# Patient Record
Sex: FEMALE | Race: WHITE | NOT HISPANIC OR LATINO | Employment: FULL TIME | URBAN - METROPOLITAN AREA
[De-identification: names, ages, dates, MRNs, and addresses within clinical notes are randomized per-mention and may not be internally consistent; named-entity substitution may affect disease eponyms.]

---

## 2019-01-03 LAB
EXTERNAL HEPATITIS B SURFACE ANTIGEN: NEGATIVE
EXTERNAL HEPATITIS C AB: NEGATIVE
EXTERNAL RUBELLA QUALITATIVE: NORMAL
HIV1 P24 AG SERPL QL IA: NORMAL

## 2019-05-31 LAB — EXTERNAL GTT 1 HOUR: 109

## 2019-07-24 ENCOUNTER — LAB (OUTPATIENT)
Dept: LAB | Facility: HOSPITAL | Age: 34
End: 2019-07-24

## 2019-07-24 ENCOUNTER — TRANSCRIBE ORDERS (OUTPATIENT)
Dept: LAB | Facility: HOSPITAL | Age: 34
End: 2019-07-24

## 2019-07-24 DIAGNOSIS — Z34.03 ENCOUNTER FOR SUPERVISION OF NORMAL FIRST PREGNANCY IN THIRD TRIMESTER: Primary | ICD-10-CM

## 2019-07-24 DIAGNOSIS — Z34.03 ENCOUNTER FOR SUPERVISION OF NORMAL FIRST PREGNANCY IN THIRD TRIMESTER: ICD-10-CM

## 2019-07-24 PROCEDURE — 87081 CULTURE SCREEN ONLY: CPT

## 2019-07-27 LAB
BACTERIA SPEC AEROBE CULT: ABNORMAL
STREP GROUPING: ABNORMAL

## 2019-08-06 ENCOUNTER — ANESTHESIA (OUTPATIENT)
Dept: LABOR AND DELIVERY | Facility: HOSPITAL | Age: 34
End: 2019-08-06

## 2019-08-06 ENCOUNTER — ANESTHESIA EVENT (OUTPATIENT)
Dept: LABOR AND DELIVERY | Facility: HOSPITAL | Age: 34
End: 2019-08-06

## 2019-08-06 ENCOUNTER — HOSPITAL ENCOUNTER (INPATIENT)
Facility: HOSPITAL | Age: 34
LOS: 2 days | Discharge: HOME | End: 2019-08-08
Attending: OBSTETRICS & GYNECOLOGY | Admitting: OBSTETRICS & GYNECOLOGY

## 2019-08-06 PROBLEM — Z37.9 NORMAL LABOR: Status: ACTIVE | Noted: 2019-08-06

## 2019-08-06 LAB
ABO GROUP BLD: NORMAL
ALP SERPL-CCNC: 125 U/L (ref 39–117)
ALT SERPL W P-5'-P-CCNC: 9 U/L (ref 1–33)
AMPHET+METHAMPHET UR QL: NEGATIVE
AMPHETAMINES UR QL: NEGATIVE
AST SERPL-CCNC: 13 U/L (ref 1–32)
BARBITURATES UR QL SCN: NEGATIVE
BENZODIAZ UR QL SCN: NEGATIVE
BILIRUB SERPL-MCNC: 0.2 MG/DL (ref 0.2–1.2)
BLD GP AB SCN SERPL QL: NEGATIVE
BUPRENORPHINE SERPL-MCNC: NEGATIVE NG/ML
CANNABINOIDS SERPL QL: NEGATIVE
COCAINE UR QL: NEGATIVE
CREAT BLD-MCNC: 0.44 MG/DL (ref 0.57–1)
DEPRECATED RDW RBC AUTO: 39.8 FL (ref 37–54)
ERYTHROCYTE [DISTWIDTH] IN BLOOD BY AUTOMATED COUNT: 12.3 % (ref 12.3–15.4)
HCT VFR BLD AUTO: 40.2 % (ref 34–46.6)
HGB BLD-MCNC: 13.6 G/DL (ref 12–15.9)
LDH SERPL-CCNC: 147 U/L (ref 135–214)
MCH RBC QN AUTO: 30.5 PG (ref 26.6–33)
MCHC RBC AUTO-ENTMCNC: 33.8 G/DL (ref 31.5–35.7)
MCV RBC AUTO: 90.1 FL (ref 79–97)
METHADONE UR QL SCN: NEGATIVE
OPIATES UR QL: NEGATIVE
OXYCODONE UR QL SCN: NEGATIVE
PCP UR QL SCN: NEGATIVE
PLATELET # BLD AUTO: 308 10*3/MM3 (ref 140–450)
PMV BLD AUTO: 11.4 FL (ref 6–12)
PROPOXYPH UR QL: NEGATIVE
RBC # BLD AUTO: 4.46 10*6/MM3 (ref 3.77–5.28)
RH BLD: POSITIVE
T&S EXPIRATION DATE: NORMAL
TRICYCLICS UR QL SCN: NEGATIVE
URATE SERPL-MCNC: 3.4 MG/DL (ref 2.4–5.7)
WBC NRBC COR # BLD: 10.52 10*3/MM3 (ref 3.4–10.8)

## 2019-08-06 PROCEDURE — 84450 TRANSFERASE (AST) (SGOT): CPT | Performed by: OBSTETRICS & GYNECOLOGY

## 2019-08-06 PROCEDURE — 86850 RBC ANTIBODY SCREEN: CPT | Performed by: OBSTETRICS & GYNECOLOGY

## 2019-08-06 PROCEDURE — 84550 ASSAY OF BLOOD/URIC ACID: CPT | Performed by: OBSTETRICS & GYNECOLOGY

## 2019-08-06 PROCEDURE — 82565 ASSAY OF CREATININE: CPT | Performed by: OBSTETRICS & GYNECOLOGY

## 2019-08-06 PROCEDURE — 84460 ALANINE AMINO (ALT) (SGPT): CPT | Performed by: OBSTETRICS & GYNECOLOGY

## 2019-08-06 PROCEDURE — 80306 DRUG TEST PRSMV INSTRMNT: CPT | Performed by: OBSTETRICS & GYNECOLOGY

## 2019-08-06 PROCEDURE — C1755 CATHETER, INTRASPINAL: HCPCS | Performed by: ANESTHESIOLOGY

## 2019-08-06 PROCEDURE — 86900 BLOOD TYPING SEROLOGIC ABO: CPT | Performed by: OBSTETRICS & GYNECOLOGY

## 2019-08-06 PROCEDURE — 86901 BLOOD TYPING SEROLOGIC RH(D): CPT | Performed by: OBSTETRICS & GYNECOLOGY

## 2019-08-06 PROCEDURE — 59025 FETAL NON-STRESS TEST: CPT

## 2019-08-06 PROCEDURE — 83615 LACTATE (LD) (LDH) ENZYME: CPT | Performed by: OBSTETRICS & GYNECOLOGY

## 2019-08-06 PROCEDURE — 86901 BLOOD TYPING SEROLOGIC RH(D): CPT

## 2019-08-06 PROCEDURE — 86900 BLOOD TYPING SEROLOGIC ABO: CPT

## 2019-08-06 PROCEDURE — 51703 INSERT BLADDER CATH COMPLEX: CPT

## 2019-08-06 PROCEDURE — 25010000002 ROPIVACAINE PER 1 MG: Performed by: ANESTHESIOLOGY

## 2019-08-06 PROCEDURE — 25010000002 PENICILLIN G POTASSIUM PER 600000 UNITS: Performed by: OBSTETRICS & GYNECOLOGY

## 2019-08-06 PROCEDURE — C1755 CATHETER, INTRASPINAL: HCPCS

## 2019-08-06 PROCEDURE — 25010000002 ONDANSETRON PER 1 MG: Performed by: OBSTETRICS & GYNECOLOGY

## 2019-08-06 PROCEDURE — 84075 ASSAY ALKALINE PHOSPHATASE: CPT | Performed by: OBSTETRICS & GYNECOLOGY

## 2019-08-06 PROCEDURE — 82247 BILIRUBIN TOTAL: CPT | Performed by: OBSTETRICS & GYNECOLOGY

## 2019-08-06 PROCEDURE — 85027 COMPLETE CBC AUTOMATED: CPT | Performed by: OBSTETRICS & GYNECOLOGY

## 2019-08-06 PROCEDURE — 25010000002 FENTANYL CITRATE (PF) 100 MCG/2ML SOLUTION: Performed by: ANESTHESIOLOGY

## 2019-08-06 RX ORDER — EPHEDRINE SULFATE/0.9% NACL/PF 25 MG/5 ML
5 SYRINGE (ML) INTRAVENOUS
Status: DISCONTINUED | OUTPATIENT
Start: 2019-08-06 | End: 2019-08-06 | Stop reason: HOSPADM

## 2019-08-06 RX ORDER — LIDOCAINE HYDROCHLORIDE 10 MG/ML
5 INJECTION, SOLUTION EPIDURAL; INFILTRATION; INTRACAUDAL; PERINEURAL AS NEEDED
Status: DISCONTINUED | OUTPATIENT
Start: 2019-08-06 | End: 2019-08-06 | Stop reason: HOSPADM

## 2019-08-06 RX ORDER — OXYCODONE HYDROCHLORIDE AND ACETAMINOPHEN 5; 325 MG/1; MG/1
1 TABLET ORAL EVERY 4 HOURS PRN
Status: DISCONTINUED | OUTPATIENT
Start: 2019-08-06 | End: 2019-08-08 | Stop reason: HOSPADM

## 2019-08-06 RX ORDER — PROMETHAZINE HYDROCHLORIDE 12.5 MG/1
12.5 SUPPOSITORY RECTAL EVERY 6 HOURS PRN
Status: DISCONTINUED | OUTPATIENT
Start: 2019-08-06 | End: 2019-08-08 | Stop reason: HOSPADM

## 2019-08-06 RX ORDER — SODIUM CHLORIDE 0.9 % (FLUSH) 0.9 %
3-10 SYRINGE (ML) INJECTION AS NEEDED
Status: DISCONTINUED | OUTPATIENT
Start: 2019-08-06 | End: 2019-08-06 | Stop reason: HOSPADM

## 2019-08-06 RX ORDER — PRENATAL VIT/IRON FUM/FOLIC AC 27MG-0.8MG
1 TABLET ORAL DAILY
Status: DISCONTINUED | OUTPATIENT
Start: 2019-08-07 | End: 2019-08-08 | Stop reason: HOSPADM

## 2019-08-06 RX ORDER — MORPHINE SULFATE 2 MG/ML
2 INJECTION, SOLUTION INTRAMUSCULAR; INTRAVENOUS
Status: DISCONTINUED | OUTPATIENT
Start: 2019-08-06 | End: 2019-08-06 | Stop reason: HOSPADM

## 2019-08-06 RX ORDER — ACETAMINOPHEN 325 MG/1
650 TABLET ORAL EVERY 4 HOURS PRN
Status: DISCONTINUED | OUTPATIENT
Start: 2019-08-06 | End: 2019-08-06 | Stop reason: SDUPTHER

## 2019-08-06 RX ORDER — OXYTOCIN-SODIUM CHLORIDE 0.9% IV SOLN 30 UNIT/500ML 30-0.9/5 UT/ML-%
85 SOLUTION INTRAVENOUS ONCE
Status: COMPLETED | OUTPATIENT
Start: 2019-08-06 | End: 2019-08-06

## 2019-08-06 RX ORDER — PROMETHAZINE HYDROCHLORIDE 25 MG/ML
12.5 INJECTION, SOLUTION INTRAMUSCULAR; INTRAVENOUS EVERY 6 HOURS PRN
Status: DISCONTINUED | OUTPATIENT
Start: 2019-08-06 | End: 2019-08-06 | Stop reason: SDUPTHER

## 2019-08-06 RX ORDER — HYDROCODONE BITARTRATE AND ACETAMINOPHEN 5; 325 MG/1; MG/1
1 TABLET ORAL EVERY 4 HOURS PRN
Status: DISCONTINUED | OUTPATIENT
Start: 2019-08-06 | End: 2019-08-08 | Stop reason: HOSPADM

## 2019-08-06 RX ORDER — OXYCODONE HYDROCHLORIDE AND ACETAMINOPHEN 5; 325 MG/1; MG/1
2 TABLET ORAL EVERY 4 HOURS PRN
Status: DISCONTINUED | OUTPATIENT
Start: 2019-08-06 | End: 2019-08-06 | Stop reason: HOSPADM

## 2019-08-06 RX ORDER — OXYTOCIN-SODIUM CHLORIDE 0.9% IV SOLN 30 UNIT/500ML 30-0.9/5 UT/ML-%
650 SOLUTION INTRAVENOUS ONCE
Status: COMPLETED | OUTPATIENT
Start: 2019-08-06 | End: 2019-08-06

## 2019-08-06 RX ORDER — PROMETHAZINE HYDROCHLORIDE 12.5 MG/1
12.5 SUPPOSITORY RECTAL EVERY 6 HOURS PRN
Status: DISCONTINUED | OUTPATIENT
Start: 2019-08-06 | End: 2019-08-06 | Stop reason: SDUPTHER

## 2019-08-06 RX ORDER — DOCUSATE SODIUM 100 MG/1
100 CAPSULE, LIQUID FILLED ORAL 2 TIMES DAILY PRN
Status: DISCONTINUED | OUTPATIENT
Start: 2019-08-06 | End: 2019-08-08 | Stop reason: HOSPADM

## 2019-08-06 RX ORDER — PROMETHAZINE HYDROCHLORIDE 25 MG/ML
12.5 INJECTION, SOLUTION INTRAMUSCULAR; INTRAVENOUS EVERY 6 HOURS PRN
Status: DISCONTINUED | OUTPATIENT
Start: 2019-08-06 | End: 2019-08-06 | Stop reason: HOSPADM

## 2019-08-06 RX ORDER — PROMETHAZINE HYDROCHLORIDE 12.5 MG/1
12.5 TABLET ORAL EVERY 6 HOURS PRN
Status: DISCONTINUED | OUTPATIENT
Start: 2019-08-06 | End: 2019-08-06 | Stop reason: HOSPADM

## 2019-08-06 RX ORDER — BISACODYL 10 MG
10 SUPPOSITORY, RECTAL RECTAL DAILY PRN
Status: DISCONTINUED | OUTPATIENT
Start: 2019-08-07 | End: 2019-08-08 | Stop reason: HOSPADM

## 2019-08-06 RX ORDER — IBUPROFEN 600 MG/1
600 TABLET ORAL EVERY 6 HOURS PRN
Status: DISCONTINUED | OUTPATIENT
Start: 2019-08-06 | End: 2019-08-06 | Stop reason: HOSPADM

## 2019-08-06 RX ORDER — ONDANSETRON 2 MG/ML
4 INJECTION INTRAMUSCULAR; INTRAVENOUS EVERY 6 HOURS PRN
Status: DISCONTINUED | OUTPATIENT
Start: 2019-08-06 | End: 2019-08-06 | Stop reason: HOSPADM

## 2019-08-06 RX ORDER — ACETAMINOPHEN 325 MG/1
650 TABLET ORAL EVERY 4 HOURS PRN
Status: DISCONTINUED | OUTPATIENT
Start: 2019-08-06 | End: 2019-08-06 | Stop reason: HOSPADM

## 2019-08-06 RX ORDER — MAGNESIUM CARB/ALUMINUM HYDROX 105-160MG
30 TABLET,CHEWABLE ORAL ONCE
Status: DISCONTINUED | OUTPATIENT
Start: 2019-08-06 | End: 2019-08-06 | Stop reason: HOSPADM

## 2019-08-06 RX ORDER — ACETAMINOPHEN 325 MG/1
650 TABLET ORAL EVERY 4 HOURS PRN
Status: DISCONTINUED | OUTPATIENT
Start: 2019-08-06 | End: 2019-08-08 | Stop reason: HOSPADM

## 2019-08-06 RX ORDER — ROPIVACAINE HYDROCHLORIDE 2 MG/ML
15 INJECTION, SOLUTION EPIDURAL; INFILTRATION; PERINEURAL CONTINUOUS
Status: DISCONTINUED | OUTPATIENT
Start: 2019-08-06 | End: 2019-08-08

## 2019-08-06 RX ORDER — SODIUM CHLORIDE 0.9 % (FLUSH) 0.9 %
3 SYRINGE (ML) INJECTION EVERY 12 HOURS SCHEDULED
Status: DISCONTINUED | OUTPATIENT
Start: 2019-08-06 | End: 2019-08-06 | Stop reason: HOSPADM

## 2019-08-06 RX ORDER — ROPIVACAINE HYDROCHLORIDE 5 MG/ML
INJECTION, SOLUTION EPIDURAL; INFILTRATION; PERINEURAL AS NEEDED
Status: DISCONTINUED | OUTPATIENT
Start: 2019-08-06 | End: 2019-08-06 | Stop reason: SURG

## 2019-08-06 RX ORDER — LIDOCAINE HYDROCHLORIDE AND EPINEPHRINE 15; 5 MG/ML; UG/ML
INJECTION, SOLUTION EPIDURAL AS NEEDED
Status: DISCONTINUED | OUTPATIENT
Start: 2019-08-06 | End: 2019-08-06 | Stop reason: SURG

## 2019-08-06 RX ORDER — MORPHINE SULFATE 2 MG/ML
2 INJECTION, SOLUTION INTRAMUSCULAR; INTRAVENOUS
Status: DISCONTINUED | OUTPATIENT
Start: 2019-08-06 | End: 2019-08-06 | Stop reason: SDUPTHER

## 2019-08-06 RX ORDER — PENICILLIN G 3000000 [IU]/50ML
3 INJECTION, SOLUTION INTRAVENOUS EVERY 4 HOURS
Status: DISCONTINUED | OUTPATIENT
Start: 2019-08-06 | End: 2019-08-06 | Stop reason: HOSPADM

## 2019-08-06 RX ORDER — PROMETHAZINE HYDROCHLORIDE 25 MG/1
25 TABLET ORAL EVERY 6 HOURS PRN
Status: DISCONTINUED | OUTPATIENT
Start: 2019-08-06 | End: 2019-08-08 | Stop reason: HOSPADM

## 2019-08-06 RX ORDER — OXYTOCIN-SODIUM CHLORIDE 0.9% IV SOLN 30 UNIT/500ML 30-0.9/5 UT/ML-%
2-24 SOLUTION INTRAVENOUS
Status: DISCONTINUED | OUTPATIENT
Start: 2019-08-06 | End: 2019-08-08

## 2019-08-06 RX ORDER — SODIUM CHLORIDE 0.9 % (FLUSH) 0.9 %
1-10 SYRINGE (ML) INJECTION AS NEEDED
Status: DISCONTINUED | OUTPATIENT
Start: 2019-08-06 | End: 2019-08-08 | Stop reason: HOSPADM

## 2019-08-06 RX ORDER — PROMETHAZINE HYDROCHLORIDE 12.5 MG/1
12.5 SUPPOSITORY RECTAL EVERY 6 HOURS PRN
Status: DISCONTINUED | OUTPATIENT
Start: 2019-08-06 | End: 2019-08-06 | Stop reason: HOSPADM

## 2019-08-06 RX ORDER — PROMETHAZINE HYDROCHLORIDE 12.5 MG/1
12.5 TABLET ORAL EVERY 6 HOURS PRN
Status: DISCONTINUED | OUTPATIENT
Start: 2019-08-06 | End: 2019-08-06 | Stop reason: SDUPTHER

## 2019-08-06 RX ORDER — ONDANSETRON 4 MG/1
4 TABLET, FILM COATED ORAL EVERY 6 HOURS PRN
Status: DISCONTINUED | OUTPATIENT
Start: 2019-08-06 | End: 2019-08-06 | Stop reason: HOSPADM

## 2019-08-06 RX ORDER — ZOLPIDEM TARTRATE 5 MG/1
5 TABLET ORAL NIGHTLY PRN
Status: DISCONTINUED | OUTPATIENT
Start: 2019-08-06 | End: 2019-08-08 | Stop reason: HOSPADM

## 2019-08-06 RX ORDER — PRENATAL WITH FERROUS FUM AND FOLIC ACID 3080; 920; 120; 400; 22; 1.84; 3; 20; 10; 1; 12; 200; 27; 25; 2 [IU]/1; [IU]/1; MG/1; [IU]/1; MG/1; MG/1; MG/1; MG/1; MG/1; MG/1; UG/1; MG/1; MG/1; MG/1; MG/1
TABLET ORAL DAILY
COMMUNITY
End: 2022-01-18

## 2019-08-06 RX ORDER — IBUPROFEN 600 MG/1
600 TABLET ORAL EVERY 6 HOURS PRN
Status: DISCONTINUED | OUTPATIENT
Start: 2019-08-06 | End: 2019-08-08 | Stop reason: HOSPADM

## 2019-08-06 RX ORDER — PROMETHAZINE HYDROCHLORIDE 25 MG/ML
12.5 INJECTION, SOLUTION INTRAMUSCULAR; INTRAVENOUS EVERY 6 HOURS PRN
Status: DISCONTINUED | OUTPATIENT
Start: 2019-08-06 | End: 2019-08-08 | Stop reason: HOSPADM

## 2019-08-06 RX ORDER — FENTANYL CITRATE 50 UG/ML
INJECTION, SOLUTION INTRAMUSCULAR; INTRAVENOUS AS NEEDED
Status: DISCONTINUED | OUTPATIENT
Start: 2019-08-06 | End: 2019-08-06 | Stop reason: SURG

## 2019-08-06 RX ORDER — SODIUM CHLORIDE, SODIUM LACTATE, POTASSIUM CHLORIDE, CALCIUM CHLORIDE 600; 310; 30; 20 MG/100ML; MG/100ML; MG/100ML; MG/100ML
125 INJECTION, SOLUTION INTRAVENOUS CONTINUOUS
Status: DISCONTINUED | OUTPATIENT
Start: 2019-08-06 | End: 2019-08-08

## 2019-08-06 RX ADMIN — OXYTOCIN 650 ML/HR: 10 INJECTION INTRAVENOUS at 19:23

## 2019-08-06 RX ADMIN — OXYTOCIN 85 ML/HR: 10 INJECTION INTRAVENOUS at 19:53

## 2019-08-06 RX ADMIN — LIDOCAINE HYDROCHLORIDE AND EPINEPHRINE 3 ML: 15; 5 INJECTION, SOLUTION EPIDURAL at 11:29

## 2019-08-06 RX ADMIN — ACETAMINOPHEN 650 MG: 325 TABLET ORAL at 21:39

## 2019-08-06 RX ADMIN — WITCH HAZEL 1 PAD: 500 SOLUTION RECTAL; TOPICAL at 21:39

## 2019-08-06 RX ADMIN — ONDANSETRON 4 MG: 2 INJECTION INTRAMUSCULAR; INTRAVENOUS at 12:09

## 2019-08-06 RX ADMIN — ONDANSETRON 4 MG: 2 INJECTION INTRAMUSCULAR; INTRAVENOUS at 18:28

## 2019-08-06 RX ADMIN — ROPIVACAINE HYDROCHLORIDE 15 ML/HR: 2 INJECTION, SOLUTION EPIDURAL; INFILTRATION at 11:29

## 2019-08-06 RX ADMIN — OXYTOCIN 2 MILLI-UNITS/MIN: 10 INJECTION INTRAVENOUS at 07:37

## 2019-08-06 RX ADMIN — ROPIVACAINE HYDROCHLORIDE 10 ML: 5 INJECTION, SOLUTION EPIDURAL; INFILTRATION; PERINEURAL at 11:29

## 2019-08-06 RX ADMIN — FENTANYL CITRATE 100 MCG: 50 INJECTION, SOLUTION INTRAMUSCULAR; INTRAVENOUS at 11:29

## 2019-08-06 RX ADMIN — Medication: at 21:39

## 2019-08-06 RX ADMIN — Medication 10 MG: at 12:13

## 2019-08-06 RX ADMIN — SODIUM CHLORIDE, POTASSIUM CHLORIDE, SODIUM LACTATE AND CALCIUM CHLORIDE 1000 ML: 600; 310; 30; 20 INJECTION, SOLUTION INTRAVENOUS at 10:36

## 2019-08-06 RX ADMIN — SODIUM CHLORIDE 5 MILLION UNITS: 900 INJECTION INTRAVENOUS at 07:37

## 2019-08-06 RX ADMIN — BENZOCAINE 1 APPLICATION: 5.6 OINTMENT TOPICAL at 22:10

## 2019-08-06 RX ADMIN — PENICILLIN G 3 MILLION UNITS: 3000000 INJECTION, SOLUTION INTRAVENOUS at 16:08

## 2019-08-06 RX ADMIN — PENICILLIN G 3 MILLION UNITS: 3000000 INJECTION, SOLUTION INTRAVENOUS at 11:51

## 2019-08-06 RX ADMIN — SODIUM CHLORIDE, POTASSIUM CHLORIDE, SODIUM LACTATE AND CALCIUM CHLORIDE 125 ML/HR: 600; 310; 30; 20 INJECTION, SOLUTION INTRAVENOUS at 12:18

## 2019-08-06 NOTE — PLAN OF CARE
Problem: Labor (Cervical Ripen, Induct, Augment) (Adult,Obstetrics,Pediatric)  Goal: Signs and Symptoms of Listed Potential Problems Will be Absent, Minimized or Managed (Labor)  Outcome: Ongoing (interventions implemented as appropriate)   08/06/19 3826   Goal/Outcome Evaluation   Problems Assessed (Labor) all   Problems Present (Labor) pain

## 2019-08-06 NOTE — L&D DELIVERY NOTE
[place]  Vaginal Delivery Note    Delivery     Delivery:       YOB: 2019    Time of Birth:  Gestational Age 7:18 PM   37w6d     Anesthesia: Epidural     Delivering clinician: Daniel Bean    Forceps?   No   Vacuum? No    Shoulder dystocia present: No        Delivery narrative:  Pt pushed well with towel pulling method and delivered OA over intact perineum; presenting hand delivered.     Infant    Findings: female  infant     Infant observations: Weight: No birth weight on file.   Length:    in  Observations/Comments:         Apgars:    @ 1 minute /       @ 5 minutes   Infant Name:      Placenta, Cord, and Fluid    Placenta delivered     at         Cord:    present.   Nuchal Cord?  no   Cord blood obtained:      Cord gases obtained:       Cord gas results: Venous:  No results found for: PHCVEN    Arterial:  No results found for: PHCART     Repair    Episiotomy: Not recorded    Lacerations: No   Estimated Blood Loss:             Complications  none    Disposition  Mother to Mother Baby/Postpartum  in stable condition currently.  Baby to NBN  in stable condition currently.      Daniel Bean MD  08/06/19  7:39 PM

## 2019-08-06 NOTE — PROGRESS NOTES
Pt comfortable with epidural;     Went from 4 cm to complete dilation but still -1 station; was allowed to labor down 30-40 min and started pushing.  Initially ineffective pushing efforts.  Better effect with pull/ pushing with towel.      ROP; rotated manually to ROT and then to OA.      FHT reassuring. Occasional variable with pushing.

## 2019-08-06 NOTE — ANESTHESIA PROCEDURE NOTES
Labor Epidural      Patient location during procedure: OB  Start Time: 8/6/2019 11:19 AM  Stop Time: 8/6/2019 11:35 AM  Performed By  Anesthesiologist: Miguel Torres MD  Preanesthetic Checklist  Completed: patient identified, site marked, surgical consent, pre-op evaluation, timeout performed, risks and benefits discussed and monitors and equipment checked  Prep:  Pt Position:sitting  Sterile Tech:cap, gloves, mask and sterile barrier  Prep:alcohol swabs  Monitoring:blood pressure monitoring  Epidural Block Procedure:  Approach:midline  Guidance:landmark technique  Location:L3-L4  Needle Type:Tuohy  Needle Gauge:17 G  Loss of Resistance Medium: air  Loss of Resistance: 5cm  Cath Depth at skin:15 cm  Paresthesia: none  Aspiration:negative  Test Dose:negative  Number of Attempts: 1  Post Assessment:  Dressing:occlusive dressing applied and secured with tape  Pt Tolerance:patient tolerated the procedure well with no apparent complications  Complications:no

## 2019-08-06 NOTE — H&P
"History and Physical:    Subjective     Chief Complaint   Patient presents with   • Rupture of Membranes       Shasta Rogers is a 34 y.o. year old  with an Estimated Date of Delivery: 19 currently at 37w6d presenting with leaking fluid.    Prenatal care has been with Debora Prajapati MD.  It has been significant for No Complications.        Review of Systems  Pertinent items are noted in HPI.     Past Medical History:   Diagnosis Date   • Abnormal Pap smear of cervix      History reviewed. No pertinent surgical history.  History reviewed. No pertinent family history.  Social History     Tobacco Use   • Smoking status: Never Smoker   • Smokeless tobacco: Never Used   Substance Use Topics   • Alcohol use: No     Frequency: Never   • Drug use: No     Medications Prior to Admission   Medication Sig Dispense Refill Last Dose   • hydrOXYzine (VISTARIL) 50 MG capsule Take 1 capsule by mouth 3 (Three) Times a Day As Needed for Itching or Anxiety. 15 capsule 0 2019 at 2130   • Prenatal Vit-Fe Fumarate-FA (PRENATAL ) 27-1 MG tablet tablet Take  by mouth Daily.   More than a month at Unknown time     Allergies:  Latex  OB History    Para Term  AB Living   1             SAB TAB Ectopic Molar Multiple Live Births                    # Outcome Date GA Lbr Nikko/2nd Weight Sex Delivery Anes PTL Lv   1 Current                     Objective     /79   Pulse 104   Temp 98.1 °F (36.7 °C) (Oral)   Resp 16   Ht 162.6 cm (64\")   Wt 87.5 kg (193 lb)   Breastfeeding? Yes   BMI 33.13 kg/m²     Physical Exam    General:  No acute distress           Abdomen: Gravid, nontender       FHT's: reactive    Cervix: /-2 per nurse   [place]: Contraction are none     Lab Review   External Prenatal Results     Pregnancy Outside Results - Transcribed From Office Records - See Scanned Records For Details     Test Value Date Time    Hgb 13.6 g/dL 19 0721    Hct 40.2 % 19 07    ABO A  19 07    " Rh Positive  08/06/19 0721    Antibody Screen Negative  08/06/19 0721    Glucose Fasting GTT       Glucose Tolerance Test 1 hour 109  05/31/19     Glucose Tolerance Test 3 hour       Gonorrhea (discrete)       Chlamydia (discrete)       RPR       VDRL       Syphilis Antibody       Rubella Immune  01/03/19     HBsAg Negative  01/03/19     Herpes Simplex Virus PCR       Herpes Simplex VIrus Culture       HIV Non-Reactive  01/03/19     Hep C RNA Quant PCR       Hep C Antibody negative  01/03/19     AFP       Group B Strep Streptococcus agalactiae (Group B)  07/24/19 2340    GBS Susceptibility to Clindamycin       GBS Susceptibility to Erythromycin       Fetal Fibronectin       Genetic Testing, Maternal Blood             Drug Screening     Test Value Date Time    Urine Drug Screen       Amphetamine Screen       Barbiturate Screen       Benzodiazepine Screen       Methadone Screen       Phencyclidine Screen       Opiates Screen       THC Screen       Cocaine Screen       Propoxyphene Screen       Buprenorphine Screen       Methamphetamine Screen       Oxycodone Screen       Tricyclic Antidepressants Screen                       Assessment/Plan     ASSESSMENT  1. IUP at 37w6d  2. rupture of membranes   3. GBBSpositive  PLAN  1. Admit to labor and delivery   2.  pitocin and antibiotics for GBBS prophylaxis         Debora Prajapati MD  8/6/2019@

## 2019-08-07 LAB
BASOPHILS # BLD AUTO: 0.04 10*3/MM3 (ref 0–0.2)
BASOPHILS NFR BLD AUTO: 0.2 % (ref 0–1.5)
DEPRECATED RDW RBC AUTO: 42.3 FL (ref 37–54)
EOSINOPHIL # BLD AUTO: 0.1 10*3/MM3 (ref 0–0.4)
EOSINOPHIL NFR BLD AUTO: 0.6 % (ref 0.3–6.2)
ERYTHROCYTE [DISTWIDTH] IN BLOOD BY AUTOMATED COUNT: 12.6 % (ref 12.3–15.4)
HCT VFR BLD AUTO: 37.6 % (ref 34–46.6)
HGB BLD-MCNC: 12.6 G/DL (ref 12–15.9)
IMM GRANULOCYTES # BLD AUTO: 0.16 10*3/MM3 (ref 0–0.05)
IMM GRANULOCYTES NFR BLD AUTO: 0.9 % (ref 0–0.5)
LYMPHOCYTES # BLD AUTO: 3.11 10*3/MM3 (ref 0.7–3.1)
LYMPHOCYTES NFR BLD AUTO: 17.9 % (ref 19.6–45.3)
MCH RBC QN AUTO: 31 PG (ref 26.6–33)
MCHC RBC AUTO-ENTMCNC: 33.5 G/DL (ref 31.5–35.7)
MCV RBC AUTO: 92.4 FL (ref 79–97)
MONOCYTES # BLD AUTO: 2.02 10*3/MM3 (ref 0.1–0.9)
MONOCYTES NFR BLD AUTO: 11.6 % (ref 5–12)
NEUTROPHILS # BLD AUTO: 11.96 10*3/MM3 (ref 1.7–7)
NEUTROPHILS NFR BLD AUTO: 68.8 % (ref 42.7–76)
NRBC BLD AUTO-RTO: 0 /100 WBC (ref 0–0.2)
PLATELET # BLD AUTO: 281 10*3/MM3 (ref 140–450)
PMV BLD AUTO: 11.4 FL (ref 6–12)
RBC # BLD AUTO: 4.07 10*6/MM3 (ref 3.77–5.28)
WBC NRBC COR # BLD: 17.39 10*3/MM3 (ref 3.4–10.8)

## 2019-08-07 PROCEDURE — 85025 COMPLETE CBC W/AUTO DIFF WBC: CPT | Performed by: OBSTETRICS & GYNECOLOGY

## 2019-08-07 RX ORDER — LANOLIN
CREAM (ML) TOPICAL AS NEEDED
Status: DISCONTINUED | OUTPATIENT
Start: 2019-08-07 | End: 2019-08-08

## 2019-08-07 RX ADMIN — DOCUSATE SODIUM 100 MG: 100 CAPSULE, LIQUID FILLED ORAL at 21:15

## 2019-08-07 RX ADMIN — PRENATAL VIT W/ FE FUMARATE-FA TAB 27-0.8 MG 1 TABLET: 27-0.8 TAB at 09:20

## 2019-08-07 RX ADMIN — IBUPROFEN 600 MG: 600 TABLET ORAL at 21:15

## 2019-08-07 RX ADMIN — ACETAMINOPHEN 650 MG: 325 TABLET ORAL at 05:14

## 2019-08-07 RX ADMIN — IBUPROFEN 600 MG: 600 TABLET ORAL at 13:00

## 2019-08-07 RX ADMIN — DOCUSATE SODIUM 100 MG: 100 CAPSULE, LIQUID FILLED ORAL at 09:20

## 2019-08-07 NOTE — PLAN OF CARE
Problem: Patient Care Overview  Goal: Plan of Care Review  Outcome: Ongoing (interventions implemented as appropriate)   08/07/19 0358   Coping/Psychosocial   Plan of Care Reviewed With patient   OTHER   Outcome Summary pain controlled well, lochia light, voiding, V/S WDL       Problem: Postpartum (Vaginal Delivery) (Adult,Obstetrics,Pediatric)  Goal: Signs and Symptoms of Listed Potential Problems Will be Absent, Minimized or Managed (Postpartum)  Outcome: Ongoing (interventions implemented as appropriate)   08/07/19 0358   Goal/Outcome Evaluation   Problems Assessed (Postpartum Vaginal Delivery) all   Problems Present (Postpartum Vag Deliv) none

## 2019-08-07 NOTE — ANESTHESIA POSTPROCEDURE EVALUATION
Patient: Shasta Rogers    Procedure Summary     Date:  08/06/19 Room / Location:      Anesthesia Start:  1119 Anesthesia Stop:  1918    Procedure:  LABOR ANALGESIA Diagnosis:      Scheduled Providers:   Provider:  Miguel Torres MD    Anesthesia Type:  epidural ASA Status:  2 - Emergent          Anesthesia Type: epidural  Last vitals  BP   118/77 (08/07/19 0800)   Temp   98.5 °F (36.9 °C) (08/07/19 0800)   Pulse   78 (08/07/19 0800)   Resp   16 (08/07/19 0800)     SpO2         Post Anesthesia Care and Evaluation    Patient location during evaluation: bedside  Patient participation: complete - patient participated  Level of consciousness: awake and alert  Pain management: adequate  Airway patency: patent  Anesthetic complications: No anesthetic complications    Cardiovascular status: acceptable  Respiratory status: acceptable  Hydration status: acceptable  Post Neuraxial Block status: Motor and sensory function returned to baseline and No signs or symptoms of PDPH

## 2019-08-07 NOTE — PROGRESS NOTES
8/7/2019  PPD #1    Subjective   Shasta feels well.  Patient describes her lochia less than menses.  Pain is well controlled       Objective   Temp: Temp:  [97.5 °F (36.4 °C)-98.8 °F (37.1 °C)] 98.5 °F (36.9 °C) Temp src: Oral   BP: BP: ()/(51-92) 118/77        Pulse: Heart Rate:  [] 78  RR: Resp:  [16-18] 16    General:  No acute distress   Abdomen: Fundus firm and beneath umbilicus   Pelvis: deferred     Lab Results   Component Value Date    WBC 17.39 (H) 08/07/2019    HGB 12.6 08/07/2019    HCT 37.6 08/07/2019    MCV 92.4 08/07/2019     08/07/2019    HEPBSAG Negative 01/03/2019       Assessment  1. PPD# 1 after vaginal delivery    Plan  1. Routine postpartum care.      This note has been electronically signed.    Debora Prajapati MD  August 7, 2019

## 2019-08-07 NOTE — LACTATION NOTE
08/07/19 0930   Maternal Information   Person Making Referral other (see comments)  (Courtesy visit, new patient, Teaching done)   Maternal Reason for Referral breastfeeding currently;other (see comments)  (Attempting to get baby to nurse)   Maternal Assessment   Breast Size Issue none   Breast Shape Bilateral:;round   Breast Density Bilateral:;soft   Nipples Bilateral:;everted   Maternal Infant Feeding   Maternal Emotional State assist needed   Infant Positioning clutch/football;cross-cradle   Signs of Milk Transfer audible swallow;other (see comments)  (Colostrum in nipple shield)   Pain with Feeding no   Comfort Measures Before/During Feeding infant position adjusted;latch adjusted   Comfort Measures Following Feeding air-drying encouraged;other (see comments)  (Lanolin encouraged)   Nipple Shape After Feeding, Left Breast symmetrical;appropriately projected   Nipple Shape After Feeding, Right symmetrical;appropriately projected   Latch Assistance yes   Equipment Type   Breast Pump Type double electric, personal  (Medela pump in room.)

## 2019-08-08 VITALS
DIASTOLIC BLOOD PRESSURE: 80 MMHG | RESPIRATION RATE: 18 BRPM | BODY MASS INDEX: 32.95 KG/M2 | WEIGHT: 193 LBS | SYSTOLIC BLOOD PRESSURE: 116 MMHG | HEART RATE: 86 BPM | TEMPERATURE: 98.4 F | HEIGHT: 64 IN

## 2019-08-08 RX ORDER — LANOLIN 100 %
OINTMENT (GRAM) TOPICAL AS NEEDED
Status: DISCONTINUED | OUTPATIENT
Start: 2019-08-08 | End: 2019-08-08 | Stop reason: HOSPADM

## 2019-08-08 RX ORDER — IBUPROFEN 600 MG/1
600 TABLET ORAL EVERY 6 HOURS PRN
Qty: 30 TABLET | Refills: 0 | Status: SHIPPED | OUTPATIENT
Start: 2019-08-08 | End: 2022-01-18

## 2019-08-08 RX ADMIN — Medication 2 APPLICATION: at 10:56

## 2019-08-08 RX ADMIN — PRENATAL VIT W/ FE FUMARATE-FA TAB 27-0.8 MG 1 TABLET: 27-0.8 TAB at 09:31

## 2019-08-08 NOTE — PLAN OF CARE
Problem: Patient Care Overview  Goal: Plan of Care Review  Outcome: Ongoing (interventions implemented as appropriate)   08/08/19 0636   Coping/Psychosocial   Plan of Care Reviewed With patient;spouse   OTHER   Outcome Summary VSS. Pt up ad brittany. BFing baby Q 2-3 hours. Took Motrin x 1 for pain. Lochia wnl.   Plan of Care Review   Progress improving     Goal: Individualization and Mutuality  Outcome: Ongoing (interventions implemented as appropriate)    Goal: Discharge Needs Assessment  Outcome: Ongoing (interventions implemented as appropriate)      Problem: Postpartum (Vaginal Delivery) (Adult,Obstetrics,Pediatric)  Goal: Signs and Symptoms of Listed Potential Problems Will be Absent, Minimized or Managed (Postpartum)  Outcome: Ongoing (interventions implemented as appropriate)

## 2019-08-08 NOTE — PAYOR COMM NOTE
"Salomón Rogers (34 y.o. Female)   Auth#[id number]  Discharge Date     Date of Birth Social Security Number Address Home Phone MRN    1985  [address] [facility] [address] [phone] [id number]    Zoroastrianism Marital Status          Protestant        Admission Date Admission Type Admitting Provider Attending Provider Department, Room/Bed    19 Elective Debora Prajapati MD  [facility] MOTHER BABY 4B, N425/1    Discharge Date Discharge Disposition Discharge Destination        2019 Home or Self Care              Attending Provider:  (none)   Allergies:  Latex    Isolation:  None   Infection:  None   Code Status:  Prior    Ht:  162.6 cm (64\")   Wt:  87.5 kg (193 lb)    Admission Cmt:  None   Principal Problem:  Spontaneous vaginal delivery [O80]                 Active Insurance as of 2019     Primary Coverage     Payor Plan Insurance Group Employer/Plan Group    [facility] [id number]     Payor Plan Address Payor Plan Phone Number Payor Plan Fax Number Effective Dates    [address]   2018 - None Entered    [facility] [id number]       Subscriber Name Subscriber Birth Date Member ID       SALOMÓN ROGERS 1985 [id number]                 Emergency Contacts      (Rel.) Home Phone Work Phone Mobile Phone    Franko Rogers (Spouse) [phone] -- --                 Discharge Summary      Debora Prajapati MD at 2019  8:25 AM          Discharge Summary    Date of Admission: 2019  Date of Discharge:  2019      Patient: Salomón Rogers      MR#:[id number]    Delivery Provider: Daniel Bean       Procedures:  Vaginal, Spontaneous     2019    7:18 PM        Hospital Course  Patient is a 34 y.o. female  at 37w6d status post vaginal delivery without complication. Postpartum the patient did well. She remained afebrile, with vital signs stable. She was ready for discharge on postpartum day 2.     Infant:   female  fetus " 3110 g (6 lb 13.7 oz)  with Apgar scores of 8  , 9   at five minutes.    Condition on Discharge:  Stable    Vital Signs  Temp:  [98.2 °F (36.8 °C)-98.3 °F (36.8 °C)] 98.2 °F (36.8 °C)  Heart Rate:  [86] 86  Resp:  [16] 16  BP: (117-118)/(78-79) 118/78    Lab Results   Component Value Date    WBC 17.39 (H) 2019    HGB 12.6 2019    HCT 37.6 2019    MCV 92.4 2019     2019       Discharge Disposition  Home or Self Care    Discharge Medications     Discharge Medications      New Medications      Instructions Start Date   ibuprofen 600 MG tablet  Commonly known as:  ADVIL,MOTRIN   600 mg, Oral, Every 6 Hours PRN         Continue These Medications      Instructions Start Date   Prenatal 27-1 27-1 MG tablet tablet   Oral, Daily         Stop These Medications    hydrOXYzine pamoate 50 MG capsule  Commonly known as:  VISTARIL            Discharge Diet:     Activity at Discharge:     Follow-up Appointments  No future appointments.  Additional Instructions for the Follow-ups that You Need to Schedule     Discharge Follow-up with Specified Provider: hans; 6 Weeks   As directed      To:  hans    Follow Up:  6 Weeks               MARGARITA Solitario  19  8:25 AM  Csd          Electronically signed by Debora Prajapati MD at 2019 11:48 AM

## 2019-08-08 NOTE — DISCHARGE SUMMARY
Discharge Summary    Date of Admission: 2019  Date of Discharge:  2019      Patient: Shasta Rogers      MR#:[id number]    Delivery Provider: Daniel Bean       Procedures:  Vaginal, Spontaneous     2019    7:18 PM        Hospital Course  Patient is a 34 y.o. female  at 37w6d status post vaginal delivery without complication. Postpartum the patient did well. She remained afebrile, with vital signs stable. She was ready for discharge on postpartum day 2.     Infant:   female  fetus 3110 g (6 lb 13.7 oz)  with Apgar scores of 8  , 9   at five minutes.    Condition on Discharge:  Stable    Vital Signs  Temp:  [98.2 °F (36.8 °C)-98.3 °F (36.8 °C)] 98.2 °F (36.8 °C)  Heart Rate:  [86] 86  Resp:  [16] 16  BP: (117-118)/(78-79) 118/78    Lab Results   Component Value Date    WBC 17.39 (H) 2019    HGB 12.6 2019    HCT 37.6 2019    MCV 92.4 2019     2019       Discharge Disposition  Home or Self Care    Discharge Medications     Discharge Medications      New Medications      Instructions Start Date   ibuprofen 600 MG tablet  Commonly known as:  ADVIL,MOTRIN   600 mg, Oral, Every 6 Hours PRN         Continue These Medications      Instructions Start Date   Prenatal 27-1 27-1 MG tablet tablet   Oral, Daily         Stop These Medications    hydrOXYzine pamoate 50 MG capsule  Commonly known as:  VISTARIL            Discharge Diet:     Activity at Discharge:     Follow-up Appointments  No future appointments.  Additional Instructions for the Follow-ups that You Need to Schedule     Discharge Follow-up with Specified Provider: hans; 6 Weeks   As directed      To:  hans    Follow Up:  6 Weeks               MARGARITA Solitario  19  8:25 AM  Csd

## 2019-08-25 ENCOUNTER — APPOINTMENT (OUTPATIENT)
Dept: LABOR AND DELIVERY | Facility: HOSPITAL | Age: 34
End: 2019-08-25

## 2020-10-05 ENCOUNTER — OFFICE VISIT (OUTPATIENT)
Dept: OBSTETRICS AND GYNECOLOGY | Facility: CLINIC | Age: 35
End: 2020-10-05

## 2020-10-05 VITALS
HEIGHT: 64 IN | BODY MASS INDEX: 31.12 KG/M2 | WEIGHT: 182.3 LBS | SYSTOLIC BLOOD PRESSURE: 120 MMHG | DIASTOLIC BLOOD PRESSURE: 82 MMHG

## 2020-10-05 DIAGNOSIS — Z30.09 FAMILY PLANNING ADVICE: ICD-10-CM

## 2020-10-05 DIAGNOSIS — E66.9 OBESITY (BMI 30-39.9): ICD-10-CM

## 2020-10-05 DIAGNOSIS — Z00.00 ANNUAL PHYSICAL EXAM: Primary | ICD-10-CM

## 2020-10-05 PROCEDURE — 99395 PREV VISIT EST AGE 18-39: CPT | Performed by: NURSE PRACTITIONER

## 2020-10-05 RX ORDER — NYSTATIN 100000 [USP'U]/G
POWDER TOPICAL 3 TIMES DAILY PRN
Qty: 45 G | Refills: 1 | Status: SHIPPED | OUTPATIENT
Start: 2020-10-05 | End: 2022-01-18

## 2020-10-05 NOTE — PROGRESS NOTES
GYN Annual Exam     CC - Here for annual exam.        HPI  Shasta Rogers is a 35 y.o. female, , who presents for annual well woman exam. Patient's last menstrual period was 2020 (exact date)..  Periods are regular every 25-35 days, lasting 3 days. .  Dysmenorrhea:none.  Patient reports problems with: none. Partner Status: Marital Status: .  New Partners since last visit: no.  Desires STD Screening: no. Pt and her  have discussed having another baby.     Additional OB/GYN History   Current contraception: contraceptive methods: None  Desires to: do not start contraception  Last Pap : 2017 - negative  Last Completed Pap Smear       Status Date      PAP SMEAR No completions recorded        History of abnormal Pap smear: yes - years ago  Family history of uterine, colon, breast, or ovarian cancer: no  Performs monthly Self-Breast Exam: yes  Exercises Regularly:no  Feelings of Anxiety or Depression: no  Tobacco Usage?: No   OB History        1    Para   1    Term   1            AB        Living   1       SAB        TAB        Ectopic        Molar        Multiple   0    Live Births   1                Health Maintenance   Topic Date Due   • Annual Gynecologic Pelvic and Breast Exam  1985   • TDAP/TD VACCINES (1 - Tdap) 2004   • INFLUENZA VACCINE  2020   • PAP SMEAR  2020   • ANNUAL PHYSICAL  10/06/2021   • HEPATITIS C SCREENING  Completed   • Pneumococcal Vaccine 0-64  Aged Out       The additional following portions of the patient's history were reviewed and updated as appropriate: allergies, current medications, past family history, past medical history, past social history, past surgical history and problem list.    Review of Systems   Constitutional: Negative.    HENT: Negative.    Eyes: Negative.    Respiratory: Negative.    Cardiovascular: Negative.    Gastrointestinal: Negative.    Endocrine: Negative.    Genitourinary: Negative.   "  Musculoskeletal: Negative.    Skin: Negative.    Allergic/Immunologic: Negative.    Neurological: Negative.    Hematological: Negative.    Psychiatric/Behavioral: Negative.      All other systems reviewed and are negative.     I have reviewed and agree with the HPI, ROS, and historical information as entered above. Dorcas Acevedo, APRN    Objective   /82   Ht 162.6 cm (64\")   Wt 82.7 kg (182 lb 4.8 oz)   LMP 09/21/2020 (Exact Date)   Breastfeeding No   BMI 31.29 kg/m²     Physical Exam  Vitals signs and nursing note reviewed. Exam conducted with a chaperone present.   Constitutional:       Appearance: She is well-developed.   HENT:      Head: Normocephalic and atraumatic.   Neck:      Musculoskeletal: Normal range of motion. No muscular tenderness.      Thyroid: No thyroid mass or thyromegaly.   Cardiovascular:      Rate and Rhythm: Normal rate and regular rhythm.      Heart sounds: No murmur.   Pulmonary:      Effort: Pulmonary effort is normal. No retractions.      Breath sounds: Normal breath sounds. No wheezing, rhonchi or rales.   Chest:      Chest wall: No mass or tenderness.      Breasts:         Right: Normal. No mass, nipple discharge, skin change or tenderness.         Left: Normal. No mass, nipple discharge, skin change or tenderness.   Abdominal:      General: Bowel sounds are normal.      Palpations: Abdomen is soft. Abdomen is not rigid. There is no mass.      Tenderness: There is no abdominal tenderness. There is no guarding.      Hernia: No hernia is present. There is no hernia in the left inguinal area.   Genitourinary:     Labia:         Right: No rash, tenderness or lesion.         Left: No rash, tenderness or lesion.       Vagina: Normal. No vaginal discharge or lesions.      Cervix: No cervical motion tenderness, discharge, lesion or cervical bleeding.      Uterus: Normal. Not enlarged, not fixed and not tender.       Adnexa:         Right: No mass or tenderness.          Left: No mass " or tenderness.        Rectum: No external hemorrhoid.   Neurological:      Mental Status: She is alert and oriented to person, place, and time.   Psychiatric:         Behavior: Behavior normal.            Assessment and Plan    Problem List Items Addressed This Visit     None      Visit Diagnoses     Annual physical exam    -  Primary    Relevant Orders    Pap IG, HPV-hr    Family planning advice        Obesity (BMI 30-39.9)              1. GYN annual well woman exam.   2. Preconceptual Counseling.  Discussed timed intercourse, regular cycles, prenatal vitamins, and when to call.  3. Reviewed monthly self breast exams.  Instructed to call with lumps, pain, or breast discharge.    4. Reviewed exercise as a preventative health measures.   5. Reccommended Flu Vaccine in Fall of each year.  6. RTC in 1 year or PRN with problems      Dorcas Acevedo, APRN  10/05/2020

## 2020-10-15 DIAGNOSIS — Z00.00 ANNUAL PHYSICAL EXAM: ICD-10-CM

## 2022-01-18 ENCOUNTER — OFFICE VISIT (OUTPATIENT)
Dept: OBSTETRICS AND GYNECOLOGY | Facility: CLINIC | Age: 37
End: 2022-01-18

## 2022-01-18 VITALS
DIASTOLIC BLOOD PRESSURE: 84 MMHG | HEIGHT: 64 IN | WEIGHT: 182 LBS | BODY MASS INDEX: 31.07 KG/M2 | SYSTOLIC BLOOD PRESSURE: 122 MMHG

## 2022-01-18 DIAGNOSIS — R10.2 PELVIC PAIN: ICD-10-CM

## 2022-01-18 DIAGNOSIS — R68.82 DECREASED LIBIDO: ICD-10-CM

## 2022-01-18 DIAGNOSIS — F41.9 ANXIETY: ICD-10-CM

## 2022-01-18 DIAGNOSIS — Z01.419 WOMEN'S ANNUAL ROUTINE GYNECOLOGICAL EXAMINATION: Primary | ICD-10-CM

## 2022-01-18 PROCEDURE — 99213 OFFICE O/P EST LOW 20 MIN: CPT | Performed by: OBSTETRICS & GYNECOLOGY

## 2022-01-18 PROCEDURE — 99395 PREV VISIT EST AGE 18-39: CPT | Performed by: OBSTETRICS & GYNECOLOGY

## 2022-01-18 NOTE — PROGRESS NOTES
GYN Annual Exam     CC - Here for annual exam.        TACO Rogers is a 36 y.o. female, , who presents for annual well woman exam. Patient's last menstrual period was 2022..  Periods are regular every 25-35 days, lasting 4-5 days.  Dysmenorrhea:none.  Patient reports problems with: pelvic pain. It started suddenly on  and lasted consistently for 1 week. On  she had a tightened feeling in her abdomen that was assocaited with the feeling of fullness. After that she reports the pain became more concentrated in her sides and back. SHe does have a hx of ovarian cysts. She also c/o anxiety that started when she had COVID last month but has progressively gotten worse. She has also noticed a decrease in libido that she would like to discuss.  There were no changes to her medical or surgical history since her last visit.. Partner Status: Marital Status: .  She is sexually active. She has not had new partners since her last STD testing. She does not desire STD testing.     Additional OB/GYN History   Current contraception: contraceptive methods: None. Planning to start trying for another baby.  Desires to: do not start contraception  Last Pap :   Last Completed Pap Smear          PAP SMEAR (Every 3 Years) Next due on 10/15/2023    10/15/2020  Pap IG, HPV-hr    10/05/2020  SCANNED - PAP SMEAR              History of abnormal Pap smear: yes - many years ago  Family history of uterine, colon, breast, or ovarian cancer: yes - maternal aunt had ovarian cancer. No genetic testing done. MGF had colon cancer  Performs monthly Self-Breast Exam: yes  Exercises Regularly:no  Feelings of Anxiety or Depression: yes - see above  Tobacco Usage?: No   OB History        1    Para   1    Term   1            AB        Living   1       SAB        IAB        Ectopic        Molar        Multiple   0    Live Births   1                Health Maintenance   Topic Date Due   • COVID-19 Vaccine (3 -  "Booster) 09/11/2021   • ANNUAL PHYSICAL  10/06/2021   • Annual Gynecologic Pelvic and Breast Exam  10/06/2021   • PAP SMEAR  10/15/2023   • TDAP/TD VACCINES (2 - Td or Tdap) 08/01/2029   • HEPATITIS C SCREENING  Completed   • INFLUENZA VACCINE  Completed   • Pneumococcal Vaccine 0-64  Aged Out       The additional following portions of the patient's history were reviewed and updated as appropriate: allergies, current medications, past family history, past medical history, past social history, past surgical history and problem list.    Review of Systems   Constitutional: Negative.    HENT: Negative.    Eyes: Negative.    Respiratory: Negative.    Cardiovascular: Negative.    Gastrointestinal: Positive for abdominal pain.   Endocrine: Negative.    Genitourinary: Positive for pelvic pain and pelvic pressure.   Musculoskeletal: Negative.    Skin: Negative.    Allergic/Immunologic: Negative.    Neurological: Negative.    Hematological: Negative.    Psychiatric/Behavioral: The patient is nervous/anxious.          I have reviewed and agree with the HPI, ROS, and historical information as entered above. Debora Prajapati MD    Objective   /84   Ht 162.6 cm (64\")   Wt 82.6 kg (182 lb)   LMP 01/09/2022   BMI 31.24 kg/m²     Physical Exam  Vitals and nursing note reviewed. Exam conducted with a chaperone present.   Constitutional:       Appearance: She is well-developed.   HENT:      Head: Normocephalic and atraumatic.   Neck:      Thyroid: No thyroid mass or thyromegaly.   Cardiovascular:      Rate and Rhythm: Normal rate and regular rhythm.      Heart sounds: No murmur heard.      Pulmonary:      Effort: Pulmonary effort is normal. No retractions.      Breath sounds: Normal breath sounds. No wheezing, rhonchi or rales.   Chest:      Chest wall: No mass or tenderness.   Breasts:      Right: Normal. No mass, nipple discharge, skin change or tenderness.      Left: Normal. No mass, nipple discharge, skin change or " "tenderness.       Abdominal:      General: Bowel sounds are normal.      Palpations: Abdomen is soft. Abdomen is not rigid. There is no mass.      Tenderness: There is no abdominal tenderness. There is no guarding.      Hernia: No hernia is present. There is no hernia in the left inguinal area or right inguinal area.   Genitourinary:     General: Normal vulva.      Exam position: Lithotomy position.      Pubic Area: No rash.       Labia:         Right: No rash, tenderness or lesion.         Left: No rash, tenderness or lesion.       Urethra: No urethral pain or urethral swelling.      Vagina: Normal. No vaginal discharge or lesions.      Cervix: No cervical motion tenderness, discharge, lesion or cervical bleeding.      Uterus: Normal. Not enlarged, not fixed and not tender.       Adnexa:         Right: No mass, tenderness or fullness.          Left: No mass, tenderness or fullness.        Rectum: No external hemorrhoid.   Musculoskeletal:      Cervical back: Normal range of motion. No muscular tenderness.   Neurological:      Mental Status: She is alert and oriented to person, place, and time.   Psychiatric:         Behavior: Behavior normal.            Assessment and Plan    Problem List Items Addressed This Visit        Gastrointestinal Abdominal     Pelvic pain    Overview     Patient reports an acute onset on December 17, 2021.  It occurred intensely for approximately 1 week.  It is now intermittent.  She describes it in both quadrants in the lower pelvis.  She reports it also moves up on the sides and into her lower back.  We discussed different causes of abdominopelvic pain.  We will get an ultrasound to assess.  Low suspicion for GYN nature.  Concerned that her anxiety is out of control.  She reports that she cannot get the idea that \"she has ovarian cancer and is going to die.\"         Relevant Orders    US Non-ob Transvaginal       Genitourinary and Reproductive     Decreased libido    Overview     Able to " "reach orgasm when she does have sex.  Having sex approximately 1 time a month.  Reports she feels like \"just another something somebody wants for me.\"  We discussed behavioral changes to help with her decreased libido.  We discussed the importance of exercise and anxiety control.            Mental Health    Anxiety    Overview     Patient having panic at times as well.  She is having perseverating thoughts that she cannot stop.  Mainly she has thoughts of a sense of doom happening related to work or at home.  Will refer to [facility].  Question OCD component of anxiety.         Relevant Orders    Ambulatory Referral to Behavioral Health (Completed)      Other Visit Diagnoses     Women's annual routine gynecological examination    -  Primary          1. GYN annual well woman exam.   2. Preconceptual Counseling.  Discussed timed intercourse, regular cycles, prenatal vitamins, and when to call.  3. Reviewed monthly self breast exams.  Instructed to call with lumps, pain, or breast discharge.    4. Reviewed BMI and weight loss as preventative health measures.   5. Reviewed exercise as a preventative health measures.   6. Recommended use of Vitamin D replacement and getting adequate calcium in her diet. (1500mg)  7. Reccommended Flu Vaccine in Fall of each year.  8. RTC in 1 year or PRN with problems  Return in about 1 week (around 1/25/2022) for ultrasound.      Debora Prajapati MD  01/18/2022  "

## 2022-01-21 DIAGNOSIS — Z01.419 WOMEN'S ANNUAL ROUTINE GYNECOLOGICAL EXAMINATION: ICD-10-CM

## 2022-01-27 ENCOUNTER — OFFICE VISIT (OUTPATIENT)
Dept: OBSTETRICS AND GYNECOLOGY | Facility: CLINIC | Age: 37
End: 2022-01-27

## 2022-01-27 VITALS — BODY MASS INDEX: 31.24 KG/M2 | WEIGHT: 182 LBS | DIASTOLIC BLOOD PRESSURE: 72 MMHG | SYSTOLIC BLOOD PRESSURE: 128 MMHG

## 2022-01-27 DIAGNOSIS — N83.201 CYST OF RIGHT OVARY: Primary | ICD-10-CM

## 2022-01-27 DIAGNOSIS — R10.2 PELVIC PAIN: ICD-10-CM

## 2022-01-27 PROCEDURE — 99213 OFFICE O/P EST LOW 20 MIN: CPT | Performed by: NURSE PRACTITIONER

## 2022-01-27 RX ORDER — PREDNISONE 10 MG/1
TABLET ORAL
COMMUNITY
Start: 2022-01-26 | End: 2022-09-12

## 2022-01-27 RX ORDER — DOXYCYCLINE HYCLATE 100 MG/1
CAPSULE ORAL
COMMUNITY
Start: 2022-01-26 | End: 2022-09-12

## 2022-01-27 RX ORDER — PRENATAL VIT/IRON FUM/FOLIC AC 27MG-0.8MG
TABLET ORAL DAILY
COMMUNITY

## 2022-01-27 NOTE — PROGRESS NOTES
Chief Complaint   Patient presents with   • Pelvic Pain         Subjective   HPI  Shasta Rogers is a 36 y.o. female, , who presents with evaluation of pelvic pain.      She was last seen on 2022 for her annual exam, and c/o pelvic pain.  She stated that pain started suddenly on  and lasted consistently for 1 week. On  she had a tightened feeling in her abdomen that was assocaited with the feeling of fullness. After that she reports the pain became more concentrated in her sides and back. SHe does have a hx of ovarian cysts. She also c/o anxiety that started when she had COVID last month but has progressively gotten worse.       Patient returned to office today for ultrasound.  The right ovary appearing polycystic.  Free fluid visulaized within the cul de sac.  Endometrial thickness measuring 15.5 mm.      She denies any new questions, concerns or complaints since her last appt.     Her last LMP was Patient's last menstrual period was 2022..  Periods are regular every 25-35 days, lasting 4-5 days.  Dysmenorrhea:none.  Partner Status: Marital Status: .  New Partners since last visit: no.  Desires STD Screening: no.    Problems with GI: no  History of urinary disease: no. H/o frequent UTIs in the past. States that she has not had one in a while.   Concern for Anxiety or Depression: yes, anxiety.  Exercises Regularly: no  Tobacco Usage?: No     Additional OB/GYN History   Last Pap : 2022- pap w/ reflex- negative  Last Completed Pap Smear          Ordered - PAP SMEAR (Every 3 Years) Ordered on 2022  SCANNED - PAP SMEAR    10/15/2020  Pap IG, HPV-hr    10/05/2020  SCANNED - PAP SMEAR              History of abnormal Pap smear: yes - h/o years ago  OB History        1    Para   1    Term   1            AB        Living   1       SAB        IAB        Ectopic        Molar        Multiple   0    Live Births   1                The additional following  "portions of the patient's history were reviewed and updated as appropriate: allergies, current medications, past family history, past medical history, past social history, past surgical history and problem list.    Did the patient have u/s today? Yes.  Findings showed simple right ovarian cyst.  I have personally evaluated the U/S and agree with the findings. Maryjo Shelton, APRN    Review of Systems   Constitutional: Negative.    Gastrointestinal: Negative.    Genitourinary: Positive for pelvic pain. Negative for dysuria.     All other systems reviewed and are negative.     I have reviewed and agree with the HPI, ROS, and historical information as entered above. Maryjo Shelton, APRN    Objective   /72   Wt 82.6 kg (182 lb)   LMP 01/09/2022   Breastfeeding No   BMI 31.24 kg/m²     Physical Exam  Constitutional:       Appearance: Normal appearance.   Pulmonary:      Effort: Pulmonary effort is normal.   Neurological:      Mental Status: She is alert.         Assessment/Plan     Assessment and Plan    Problem List Items Addressed This Visit        Gastrointestinal Abdominal     Pelvic pain    Overview     Patient reports an acute onset on December 17, 2021.  It occurred intensely for approximately 1 week.  It is now intermittent.  She describes it in both quadrants in the lower pelvis.  She reports it also moves up on the sides and into her lower back.  We discussed different causes of abdominopelvic pain.  We will get an ultrasound to assess.  Low suspicion for GYN nature.  Concerned that her anxiety is out of control.  She reports that she cannot get the idea that \"she has ovarian cancer and is going to die.\"  Pain improved since last visit, mild pressure on right and left 3 cm right ovarian cyst and left CL cyst, this could be the cause.             Genitourinary and Reproductive     Cyst of right ovary - Primary    Overview     34 x 17 x 20 mm, free fluid next to cyst. F/U 6 weeks.          " Relevant Orders    US Non-ob Transvaginal        1. Call for severe pain  Return in about 6 weeks (around 3/10/2022) for Ultrasound JNA F/U cyst.   Planning to try to conceive    Maryjo Shelton, APRN  01/27/2022

## 2022-06-01 ENCOUNTER — OFFICE VISIT (OUTPATIENT)
Dept: OBSTETRICS AND GYNECOLOGY | Facility: CLINIC | Age: 37
End: 2022-06-01

## 2022-06-01 VITALS
BODY MASS INDEX: 31.28 KG/M2 | DIASTOLIC BLOOD PRESSURE: 82 MMHG | HEIGHT: 64 IN | SYSTOLIC BLOOD PRESSURE: 112 MMHG | WEIGHT: 183.2 LBS

## 2022-06-01 DIAGNOSIS — N83.201 CYST OF RIGHT OVARY: Primary | ICD-10-CM

## 2022-06-01 DIAGNOSIS — Z80.41 FAMILY HISTORY OF OVARIAN CANCER: ICD-10-CM

## 2022-06-01 PROCEDURE — 99213 OFFICE O/P EST LOW 20 MIN: CPT | Performed by: NURSE PRACTITIONER

## 2022-06-01 NOTE — PROGRESS NOTES
Chief Complaint   Patient presents with   • Follow-up     Ovarian cysts       Subjective   HPI  Shasta Rogers is a 37 y.o. female, , who presents for right side abdominal pain X 2 weeks, follow up US today.  She had an ultrasound on 22 which showed a right ovarian cyst. She was recommended to have follow up ultrasound in 6 weeks. She continues to have right abdominal/ pelvic pain that comes and goes.     She states she has experienced this problem for 2 weeks.  She describes the severity as moderate.  She states that the problem is annoying and intermittent.  The patient reports additional symptoms as none.      Her last LMP was Patient's last menstrual period was 2022 (exact date)..  Periods are regular every 28-30 days, lasting 4 days.  Dysmenorrhea:mild, occurring first 1-2 days of flow.  Patient reports problems with: none.  Partner Status: Marital Status: .  New Partners since last visit: no.  Desires STD Screening: no.    Additional OB/GYN History   Current contraception: contraceptive methods: None  Desires to: continue contraception  Last Pap : 22 neg  Last Completed Pap Smear          Ordered - PAP SMEAR (Every 3 Years) Ordered on 2022  SCANNED - PAP SMEAR    10/15/2020  Pap IG, HPV-hr    10/05/2020  SCANNED - PAP SMEAR              History of abnormal Pap smear: yes - HPV   Last mammogram: Never  Last Completed Mammogram     This patient has no relevant Health Maintenance data.        Tobacco Usage?: No   OB History        1    Para   1    Term   1            AB        Living   1       SAB        IAB        Ectopic        Molar        Multiple   0    Live Births   1                Health Maintenance   Topic Date Due   • ANNUAL PHYSICAL  10/06/2021   • INFLUENZA VACCINE  2022   • Annual Gynecologic Pelvic and Breast Exam  2023   • PAP SMEAR  2025   • TDAP/TD VACCINES (2 - Td or Tdap) 2029   • HEPATITIS C SCREENING   "Completed   • COVID-19 Vaccine  Completed   • Pneumococcal Vaccine 0-64  Aged Out       The additional following portions of the patient's history were reviewed and updated as appropriate: allergies, current medications, past family history, past medical history, past social history, past surgical history and problem list.    Review of Systems   Constitutional: Negative.    Gastrointestinal: Positive for abdominal pain.   Genitourinary: Positive for pelvic pain.       I have reviewed and agree with the HPI, ROS, and historical information as entered above. Elina Pardo, APRN    Objective   /82   Ht 162.6 cm (64.02\")   Wt 83.1 kg (183 lb 3.2 oz)   LMP 05/23/2022 (Exact Date)   Breastfeeding No   BMI 31.43 kg/m²     Physical Exam  Vitals and nursing note reviewed.   Constitutional:       Appearance: Normal appearance. She is obese.   Abdominal:      Palpations: Abdomen is soft.      Tenderness: There is no abdominal tenderness.      Hernia: No hernia is present.      Comments: Non tender on exam   Neurological:      Mental Status: She is alert.         Assessment & Plan     Assessment     Problem List Items Addressed This Visit        Genitourinary and Reproductive     Cyst of right ovary - Primary    Overview     34 x 17 x 20 mm, free fluid next to cyst. F/U 6 weeks.            Relevant Orders          Other Visit Diagnoses     Family history of ovarian cancer        Relevant Orders                  Plan     Follow up ultrasound today shows elongated anechoic structure noted within right adnexa, directly medial to right ovary measuring 36 x 13 x 16mm. No free fluid visualized. Will have  review ultrasound films and call pt. Back with plan.   2.   Pt request  lab today. She has a family history of ovarian cancer in maternal aunt in her 40s.       MARGARITA Guillermo  06/01/2022  "

## 2022-06-02 LAB — CANCER AG125 SERPL-ACNC: 11 U/ML (ref 0–38.1)

## 2022-06-14 ENCOUNTER — TELEPHONE (OUTPATIENT)
Dept: OBSTETRICS AND GYNECOLOGY | Facility: CLINIC | Age: 37
End: 2022-06-14

## 2022-06-14 NOTE — TELEPHONE ENCOUNTER
Left message for patient to call office to schedule a 6 week follow up GYN ultrasound. She was seen by NP on 6/1 and NP wanted to see when  recommended she return for follow up after she saw the films.  lab normal.     Unable to reach pt. By phone but left a voicemail for her to call to schedule a 6 week follow up ultrasound.

## 2022-08-22 DIAGNOSIS — Z36.89 ESTABLISH GESTATIONAL AGE, ULTRASOUND: Primary | ICD-10-CM

## 2022-09-09 PROBLEM — R10.2 PELVIC PAIN: Status: RESOLVED | Noted: 2022-01-18 | Resolved: 2022-09-09

## 2022-09-09 PROBLEM — Z34.90 PREGNANCY: Status: ACTIVE | Noted: 2022-09-09

## 2022-09-12 ENCOUNTER — INITIAL PRENATAL (OUTPATIENT)
Dept: OBSTETRICS AND GYNECOLOGY | Facility: CLINIC | Age: 37
End: 2022-09-12

## 2022-09-12 VITALS — DIASTOLIC BLOOD PRESSURE: 70 MMHG | SYSTOLIC BLOOD PRESSURE: 112 MMHG | WEIGHT: 182.6 LBS | BODY MASS INDEX: 31.33 KG/M2

## 2022-09-12 DIAGNOSIS — O41.8X90 SUBCHORIONIC HEMORRHAGE OF PLACENTA, ANTEPARTUM: ICD-10-CM

## 2022-09-12 DIAGNOSIS — Z3A.01 LESS THAN 8 WEEKS GESTATION OF PREGNANCY: Primary | ICD-10-CM

## 2022-09-12 DIAGNOSIS — O46.8X9 SUBCHORIONIC HEMORRHAGE OF PLACENTA, ANTEPARTUM: ICD-10-CM

## 2022-09-12 DIAGNOSIS — O09.529 ANTEPARTUM MULTIGRAVIDA OF ADVANCED MATERNAL AGE: ICD-10-CM

## 2022-09-12 PROBLEM — N83.201 CYST OF RIGHT OVARY: Status: RESOLVED | Noted: 2022-01-27 | Resolved: 2022-09-12

## 2022-09-12 PROBLEM — R68.82 DECREASED LIBIDO: Status: RESOLVED | Noted: 2022-01-18 | Resolved: 2022-09-12

## 2022-09-12 PROCEDURE — 0501F PRENATAL FLOW SHEET: CPT | Performed by: OBSTETRICS & GYNECOLOGY

## 2022-09-12 NOTE — PROGRESS NOTES
Initial ob visit     CC- Here for care of pregnancy        Shasta Rogers is a 37 y.o. female, , who presents for her first obstetrical visit.   Patient's last menstrual period was 2022. She reports regular monthly periods every 28 days.    OB History    Para Term  AB Living   2 1 1     1   SAB IAB Ectopic Molar Multiple Live Births           0 1      # Outcome Date GA Lbr Nikko/2nd Weight Sex Delivery Anes PTL Lv   2 Current            1 Term 19 37w6d 11:35 / 02:43 3110 g (6 lb 13.7 oz) F Vag-Spont EPI N KRIS       Initial positive test date : 22, UPT          Prior obstetric issues: none  Patient's past medical history is significant for: none  Family history of genetic issues (includes FOB): no   Prior infections concerning in pregnancy (Rash, fever in last 2 weeks): No  Varicella Hx - history of chicken pox  Prior testing for Cystic Fibrosis Carrier or Sickle Cell Trait- no   Prepregnancy BMI - Body mass index is 31.33 kg/m².  History of STD: yes- HPV  Hx of HSV for patient or partner: no  Ultrasound Today: yes-single IUP noted, cardiac activity present, fetal pole seen measuring 6w 2 d, anechoic area seen adjacent to gestational sac that is consistent with appearance of DAVIS; cannot rule out second gestational sac due to early age.      Additional Pertinent History   Last Pap : 22Result: negative HPV: not done     Last Completed Pap Smear          Ordered - PAP SMEAR (Every 3 Years) Ordered on 2022  SCANNED - PAP SMEAR    10/15/2020  Pap IG, HPV-hr    10/05/2020  SCANNED - PAP SMEAR              History of abnormal Pap smear: yes - 15-20 years ago, repeats normal   Family history of uterine, colon, breast, or ovarian cancer: yes - colon- maternal grandfather, ovarian- maternal aunt   Feelings of Anxiety or Depression: yes - anxiety- related t advanced maternal age  Tobacco Usage?: No   Alcohol/Drug Use?: NO  Over the age of 35 at delivery:  yes  Desires Genetic Screening: Cell Free DNA  Flu Status: Already given in current flu season    PMH    Current Outpatient Medications:   •  prenatal vitamin tablet, Take  by mouth Daily., Disp: , Rfl:      Past Medical History:   Diagnosis Date   • Abnormal Pap smear of cervix    • Back pain    • Ovarian cyst    •  (spontaneous vaginal delivery)         Past Surgical History:   Procedure Laterality Date   • NO PAST SURGERIES         Review of Systems   Review of Systems  Patient Reports: Nausea and breast tenderness   Patient Denies:  vomiting, Spotting, Heavy bleeding and Cramping  All systems reviewed and otherwise normal.    I have reviewed and agree with the HPI, ROS, and historical information as entered above. Debora Prajapati MD    /70   Wt 82.8 kg (182 lb 9.6 oz)   LMP 2022   BMI 31.33 kg/m²     The additional following portions of the patient's history were reviewed and updated as appropriate: allergies, current medications, past family history, past medical history, past social history and past surgical history.    Physical Exam  General:  well developed; well nourished  no acute distress   Chest/Respiratory: No labored breathing, normal respiratory effort, normal appearance, no respiratory noises noted   Heart:  normal rate, regular rhythm,  no murmurs, rubs, or gallops   Thyroid: normal to inspection and palpation   Breasts:  Not performed.   Abdomen: soft, non-tender; no masses  no umbilical or inguinal hernias are present  no hepato-splenomegaly   Pelvis: Not performed.        Assessment and Plan    Problem List Items Addressed This Visit        Gravid and     Pregnancy - Primary    Overview     2019  baby girl Meenu weighing 6 pounds 4 ounces at 37+6 weeks after rupture         Relevant Orders    Obstetric Panel    Chlamydia trachomatis, Neisseria gonorrhoeae, PCR w/ confirmation - Urine, Urine, Clean Catch    HIV-1 / O / 2 Ag / Antibody 4th Generation     Urinalysis With Microscopic - Urine, Clean Catch    Urine Culture - Urine, Urine, Clean Catch    Urine Drug Screen - Urine, Clean Catch    AMA (advanced maternal age) multigravida 35+      Other Visit Diagnoses     Subchorionic hemorrhage of placenta, antepartum        Relevant Orders    US Ob Transvaginal          1. Pregnancy at 6w2d  2. Reviewed routine prenatal care with the office and educational materials given  3. Lab(s) Ordered  4. Discussed options for genetic testing including first trimester nuchal translucency screen, genetic disease carrier testing, quadruple screen, and [place].  5. Patient is on Prenatal vitamins  6. Activity recommendation : 150 minutes/week of moderate intensity aerobic activity unless we limit for bleeding, hypertension or other pregnancy complication   7. hgb A1C today  Return in about 2 weeks (around 9/26/2022) for ultrasound.      Debora Prajapati MD  09/12/2022

## 2022-09-15 LAB
ABO GROUP BLD: NORMAL
AMPHETAMINES UR QL SCN: NEGATIVE NG/ML
APPEARANCE UR: CLEAR
BACTERIA #/AREA URNS HPF: NORMAL /[HPF]
BACTERIA UR CULT: ABNORMAL
BARBITURATES UR QL SCN: NEGATIVE NG/ML
BASOPHILS # BLD AUTO: 0.1 X10E3/UL (ref 0–0.2)
BASOPHILS NFR BLD AUTO: 1 %
BENZODIAZ UR QL SCN: NEGATIVE NG/ML
BILIRUB UR QL STRIP: NEGATIVE
BLD GP AB SCN SERPL QL: NEGATIVE
BZE UR QL SCN: NEGATIVE NG/ML
C TRACH RRNA SPEC QL NAA+PROBE: NEGATIVE
CANNABINOIDS UR QL SCN: NEGATIVE NG/ML
CASTS URNS QL MICRO: NORMAL /LPF
COLOR UR: YELLOW
CREAT UR-MCNC: 27.2 MG/DL (ref 20–300)
EOSINOPHIL # BLD AUTO: 0 X10E3/UL (ref 0–0.4)
EOSINOPHIL NFR BLD AUTO: 0 %
EPI CELLS #/AREA URNS HPF: NORMAL /HPF (ref 0–10)
ERYTHROCYTE [DISTWIDTH] IN BLOOD BY AUTOMATED COUNT: 12.1 % (ref 11.7–15.4)
GLUCOSE UR QL STRIP: NEGATIVE
HBV SURFACE AG SERPL QL IA: NEGATIVE
HCT VFR BLD AUTO: 43.6 % (ref 34–46.6)
HCV AB S/CO SERPL IA: <0.1 S/CO RATIO (ref 0–0.9)
HGB BLD-MCNC: 14.5 G/DL (ref 11.1–15.9)
HGB UR QL STRIP: NEGATIVE
HIV 1+2 AB+HIV1 P24 AG SERPL QL IA: NON REACTIVE
IMM GRANULOCYTES # BLD AUTO: 0 X10E3/UL (ref 0–0.1)
IMM GRANULOCYTES NFR BLD AUTO: 0 %
KETONES UR QL STRIP: NEGATIVE
LABORATORY COMMENT REPORT: NORMAL
LEUKOCYTE ESTERASE UR QL STRIP: NEGATIVE
LYMPHOCYTES # BLD AUTO: 2.3 X10E3/UL (ref 0.7–3.1)
LYMPHOCYTES NFR BLD AUTO: 23 %
MCH RBC QN AUTO: 29.4 PG (ref 26.6–33)
MCHC RBC AUTO-ENTMCNC: 33.3 G/DL (ref 31.5–35.7)
MCV RBC AUTO: 88 FL (ref 79–97)
METHADONE UR QL SCN: NEGATIVE NG/ML
MICRO URNS: NORMAL
MICRO URNS: NORMAL
MONOCYTES # BLD AUTO: 0.9 X10E3/UL (ref 0.1–0.9)
MONOCYTES NFR BLD AUTO: 9 %
N GONORRHOEA RRNA SPEC QL NAA+PROBE: NEGATIVE
NEUTROPHILS # BLD AUTO: 6.8 X10E3/UL (ref 1.4–7)
NEUTROPHILS NFR BLD AUTO: 67 %
NITRITE UR QL STRIP: NEGATIVE
OPIATES UR QL SCN: NEGATIVE NG/ML
OTHER ANTIBIOTIC SUSC ISLT: ABNORMAL
OXYCODONE+OXYMORPHONE UR QL SCN: NEGATIVE NG/ML
PCP UR QL: NEGATIVE NG/ML
PH UR STRIP: 7 [PH] (ref 5–7.5)
PH UR: 6.8 [PH] (ref 4.5–8.9)
PLATELET # BLD AUTO: 339 X10E3/UL (ref 150–450)
PROPOXYPH UR QL SCN: NEGATIVE NG/ML
PROT UR QL STRIP: NEGATIVE
RBC # BLD AUTO: 4.93 X10E6/UL (ref 3.77–5.28)
RBC #/AREA URNS HPF: NORMAL /HPF (ref 0–2)
RH BLD: POSITIVE
RPR SER QL: NON REACTIVE
RUBV IGG SERPL IA-ACNC: 1.02 INDEX
SP GR UR STRIP: 1.01 (ref 1–1.03)
UROBILINOGEN UR STRIP-MCNC: 0.2 MG/DL (ref 0.2–1)
WBC # BLD AUTO: 10.1 X10E3/UL (ref 3.4–10.8)
WBC #/AREA URNS HPF: NORMAL /HPF (ref 0–5)

## 2022-09-21 DIAGNOSIS — O23.41 URINARY TRACT INFECTION IN MOTHER DURING FIRST TRIMESTER OF PREGNANCY: Primary | ICD-10-CM

## 2022-09-21 RX ORDER — NITROFURANTOIN 25; 75 MG/1; MG/1
100 CAPSULE ORAL 2 TIMES DAILY
Qty: 14 CAPSULE | Refills: 0 | Status: SHIPPED | OUTPATIENT
Start: 2022-09-21 | End: 2022-09-28

## 2022-09-22 ENCOUNTER — TELEPHONE (OUTPATIENT)
Dept: OBSTETRICS AND GYNECOLOGY | Facility: CLINIC | Age: 37
End: 2022-09-22

## 2022-09-26 ENCOUNTER — ROUTINE PRENATAL (OUTPATIENT)
Dept: OBSTETRICS AND GYNECOLOGY | Facility: CLINIC | Age: 37
End: 2022-09-26

## 2022-09-26 VITALS — WEIGHT: 183.2 LBS | SYSTOLIC BLOOD PRESSURE: 110 MMHG | BODY MASS INDEX: 31.43 KG/M2 | DIASTOLIC BLOOD PRESSURE: 70 MMHG

## 2022-09-26 DIAGNOSIS — Z3A.08 8 WEEKS GESTATION OF PREGNANCY: Primary | ICD-10-CM

## 2022-09-26 DIAGNOSIS — O23.41 URINARY TRACT INFECTION IN MOTHER DURING FIRST TRIMESTER OF PREGNANCY: ICD-10-CM

## 2022-09-26 LAB
GLUCOSE UR STRIP-MCNC: NEGATIVE MG/DL
PROT UR STRIP-MCNC: NEGATIVE MG/DL

## 2022-09-26 PROCEDURE — 0502F SUBSEQUENT PRENATAL CARE: CPT | Performed by: NURSE PRACTITIONER

## 2022-09-26 NOTE — PROGRESS NOTES
OB FOLLOW UP  CC- Here for care of pregnancy        Shasta Rogers is a 37 y.o.  8w2d patient being seen today for her obstetrical follow up visit. Patient reports headache. That is relieved by Tylenol or rest. She also reports some occasional nausea without vomiting.     Her prenatal care is complicated by (and status) : AMA  Patient Active Problem List   Diagnosis   • Anxiety   • Pregnancy   • AMA (advanced maternal age) multigravida 35+   • Urinary tract infection in mother during first trimester of pregnancy       Flu Status: Already given in current flu season  Ultrasound Today: Yes    ROS -   Patient Reports : Headaches and Nausea  Patient Denies: Loss of Fluid, Vaginal Spotting, Vision Changes and Vomiting   Fetal Movement : Absent  All other systems reviewed and are negative.     The additional following portions of the patient's history were reviewed and updated as appropriate: allergies, current medications, past family history, past medical history, past social history, past surgical history and problem list.    I have reviewed and agree with the HPI, ROS, and historical information as entered above. Elina Pardo, APRN    /70   Wt 83.1 kg (183 lb 3.2 oz)   LMP 2022   BMI 31.43 kg/m²         EXAM:     Prenatal Vitals  BP: 110/70  Weight: 83.1 kg (183 lb 3.2 oz)   Fetal Heart Rate: 176          Urine Glucose Read-only: Negative  Urine Protein Read-only: Negative       Assessment and Plan    Problem List Items Addressed This Visit        Genitourinary and Reproductive     Urinary tract infection in mother during first trimester of pregnancy    Overview     Noted at her new OB visit.  Treated with Macrobid.  Needs test of cure at second visit.         Relevant Medications    nitrofurantoin, macrocrystal-monohydrate, (Macrobid) 100 MG capsule    Other Relevant Orders    Urine Culture - Urine, Urine, Clean Catch       Gravid and     Pregnancy - Primary    Overview      2019  baby girl Meenu weighing 6 pounds 4 ounces at 37+6 weeks after rupture         Relevant Orders    POC Urinalysis Dipstick (Completed)          1. Pregnancy at 8w2d  2. Labs reviewed from New OB Visit.  3. Counseled on genetic testing, carrier status and option for NT screen. Pt. Plans to return in 2 weeks for MaterniT 21 labs  4. Activity and Exercise discussed.  5. Patient is on Prenatal vitamins   6. F/u ultrasound today shows single, viable IUP measuring 9w0d with FHR= 176 bpm, DAVIS resolved  7..   She is currently on Macrobid for a + urine culture on prenatal labs. She will repeat urine culture in 2 weeks when she returns for cfDNA testing and will see  in 4 weeks.     Elina Pardo, APRN  2022

## 2022-09-28 LAB
BACTERIA UR CULT: NORMAL
BACTERIA UR CULT: NORMAL

## 2022-10-11 ENCOUNTER — LAB (OUTPATIENT)
Dept: OBSTETRICS AND GYNECOLOGY | Facility: CLINIC | Age: 37
End: 2022-10-11

## 2022-10-11 DIAGNOSIS — O09.521 SUPERVISION OF ELDERLY MULTIGRAVIDA IN FIRST TRIMESTER: Primary | ICD-10-CM

## 2022-10-19 ENCOUNTER — ROUTINE PRENATAL (OUTPATIENT)
Dept: OBSTETRICS AND GYNECOLOGY | Facility: CLINIC | Age: 37
End: 2022-10-19

## 2022-10-19 VITALS — DIASTOLIC BLOOD PRESSURE: 78 MMHG | BODY MASS INDEX: 31.43 KG/M2 | SYSTOLIC BLOOD PRESSURE: 120 MMHG | WEIGHT: 183.2 LBS

## 2022-10-19 DIAGNOSIS — O23.41 URINARY TRACT INFECTION IN MOTHER DURING FIRST TRIMESTER OF PREGNANCY: ICD-10-CM

## 2022-10-19 DIAGNOSIS — Z34.81 PRENATAL CARE, SUBSEQUENT PREGNANCY, FIRST TRIMESTER: Primary | ICD-10-CM

## 2022-10-19 DIAGNOSIS — O09.529 ANTEPARTUM MULTIGRAVIDA OF ADVANCED MATERNAL AGE: ICD-10-CM

## 2022-10-19 DIAGNOSIS — O03.9 MISCARRIAGE: ICD-10-CM

## 2022-10-19 DIAGNOSIS — O36.8390 ULTRASOUND SCAN DONE FOR INABILITY TO HEAR FETAL HEART TONES: ICD-10-CM

## 2022-10-19 DIAGNOSIS — F41.9 ANXIETY: ICD-10-CM

## 2022-10-19 LAB
GLUCOSE UR STRIP-MCNC: NEGATIVE MG/DL
PROT UR STRIP-MCNC: NEGATIVE MG/DL

## 2022-10-19 PROCEDURE — 99213 OFFICE O/P EST LOW 20 MIN: CPT | Performed by: OBSTETRICS & GYNECOLOGY

## 2022-10-19 RX ORDER — DOXYCYCLINE 100 MG/1
100 CAPSULE ORAL 2 TIMES DAILY
Qty: 14 CAPSULE | Refills: 0 | Status: SHIPPED | OUTPATIENT
Start: 2022-10-19 | End: 2022-10-26

## 2022-10-19 NOTE — PROGRESS NOTES
OB FOLLOW UP  CC- Here for care of pregnancy        Shasta Rogers is a 37 y.o.  11w4d patient being seen today for her obstetrical follow up visit. Patient reports no complaints..     Her prenatal care is complicated by (and status) :  Patient Active Problem List   Diagnosis   • Anxiety   • Pregnancy   • AMA (advanced maternal age) multigravida 35+   • Urinary tract infection in mother during first trimester of pregnancy   • Miscarriage       Desires genetic testing?: Already had done  Flu Status: Already given in current flu season      ROS -   Patient Reports : No Problems  Patient Denies: Loss of Fluid, Vaginal Spotting, Vision Changes, Headaches, Nausea  and Vomiting   Fetal Movement : Absent  All other systems reviewed and are negative.     The additional following portions of the patient's history were reviewed and updated as appropriate: allergies, current medications, past family history, past medical history, past social history, past surgical history and problem list.    I have reviewed and agree with the HPI, ROS, and historical information as entered above. Debora Prajapati MD    /78   Wt 83.1 kg (183 lb 3.2 oz)   LMP 2022   BMI 31.43 kg/m²         EXAM:     Prenatal Vitals  BP: 120/78  Weight: 83.1 kg (183 lb 3.2 oz)   Fetal Heart Rate: absent          Urine Glucose Read-only: Negative  Urine Protein Read-only: Negative       Assessment and Plan    Problem List Items Addressed This Visit        Genitourinary and Reproductive     Urinary tract infection in mother during first trimester of pregnancy    Overview     Noted at her new OB visit.  Treated with Macrobid. SARAN neg          Relevant Medications    doxycycline (MONODOX) 100 MG capsule       Gravid and     AMA (advanced maternal age) multigravida 35+    Miscarriage    Overview     US done 10/19/22 shows embryo without heart tones measuring 10w1d, should be 12w4d by LMP/last US. D&C 10/20/22 with [facility]           Relevant Medications    doxycycline (MONODOX) 100 MG capsule       Mental Health    Anxiety    Overview     Patient having panic at times as well.  She is having perseverating thoughts that she cannot stop.  Mainly she has thoughts of a sense of doom happening related to work or at home.  Will refer to [facility].  Question OCD component of anxiety.        Other Visit Diagnoses     Prenatal care, subsequent pregnancy, first trimester    -  Primary    Relevant Orders    POC Urinalysis Dipstick (Completed)    Ultrasound scan done for inability to hear fetal heart tones        Relevant Orders    US Ob Limited 1 + Fetuses (Completed)          1. US today shows embryo measuring 10w1d without FHTs.  2. Reviewed all options for treatment.  This included expectant management, Cytotec induction, dilation and curettage.  Patient elected for dilation and curettage.  All risks and benefits were discussed with the patient including but not limited to infection, risks of anesthesia, bleeding, potential for future surgery.  Return in about 2 weeks (around 11/2/2022) for post op.    Debora Prajapati MD  10/19/2022

## 2022-10-20 ENCOUNTER — OUTSIDE FACILITY SERVICE (OUTPATIENT)
Dept: OBSTETRICS AND GYNECOLOGY | Facility: CLINIC | Age: 37
End: 2022-10-20

## 2022-10-20 ENCOUNTER — LAB REQUISITION (OUTPATIENT)
Dept: LAB | Facility: HOSPITAL | Age: 37
End: 2022-10-20

## 2022-10-20 DIAGNOSIS — Z98.890 STATUS POST DILATION AND CURETTAGE: Primary | ICD-10-CM

## 2022-10-20 DIAGNOSIS — O02.1 MISSED ABORTION: ICD-10-CM

## 2022-10-20 PROCEDURE — 59820 CARE OF MISCARRIAGE: CPT | Performed by: OBSTETRICS & GYNECOLOGY

## 2022-10-20 PROCEDURE — 88305 TISSUE EXAM BY PATHOLOGIST: CPT | Performed by: OBSTETRICS & GYNECOLOGY

## 2022-10-20 RX ORDER — PROMETHAZINE HYDROCHLORIDE 12.5 MG/1
12.5 TABLET ORAL EVERY 6 HOURS PRN
Qty: 20 TABLET | Refills: 0 | Status: SHIPPED | OUTPATIENT
Start: 2022-10-20

## 2022-10-20 RX ORDER — IBUPROFEN 800 MG/1
800 TABLET ORAL EVERY 8 HOURS PRN
Qty: 30 TABLET | Refills: 1 | Status: SHIPPED | OUTPATIENT
Start: 2022-10-20

## 2022-10-20 RX ORDER — HYDROCODONE BITARTRATE AND ACETAMINOPHEN 5; 325 MG/1; MG/1
1 TABLET ORAL EVERY 6 HOURS PRN
Qty: 8 TABLET | Refills: 0 | Status: SHIPPED | OUTPATIENT
Start: 2022-10-20

## 2022-10-21 LAB
CYTO UR: NORMAL
LAB AP CASE REPORT: NORMAL
LAB AP CLINICAL INFORMATION: NORMAL
PATH REPORT.FINAL DX SPEC: NORMAL
PATH REPORT.GROSS SPEC: NORMAL

## 2023-09-12 PROBLEM — F41.9 ANXIETY: Status: RESOLVED | Noted: 2022-01-18 | Resolved: 2023-09-12

## 2023-09-12 PROBLEM — O03.9 MISCARRIAGE: Status: RESOLVED | Noted: 2022-10-19 | Resolved: 2023-09-12

## 2023-09-12 PROBLEM — O23.41 URINARY TRACT INFECTION IN MOTHER DURING FIRST TRIMESTER OF PREGNANCY: Status: RESOLVED | Noted: 2022-09-21 | Resolved: 2023-09-12

## 2023-09-13 ENCOUNTER — INITIAL PRENATAL (OUTPATIENT)
Dept: OBSTETRICS AND GYNECOLOGY | Facility: CLINIC | Age: 38
End: 2023-09-13
Payer: COMMERCIAL

## 2023-09-13 VITALS — SYSTOLIC BLOOD PRESSURE: 126 MMHG | WEIGHT: 188 LBS | BODY MASS INDEX: 32.25 KG/M2 | DIASTOLIC BLOOD PRESSURE: 80 MMHG

## 2023-09-13 DIAGNOSIS — Z34.90 PRENATAL CARE, ANTEPARTUM: ICD-10-CM

## 2023-09-13 DIAGNOSIS — O09.529 ANTEPARTUM MULTIGRAVIDA OF ADVANCED MATERNAL AGE: Primary | ICD-10-CM

## 2023-09-13 DIAGNOSIS — O99.210 OBESITY IN PREGNANCY, ANTEPARTUM: ICD-10-CM

## 2023-09-13 LAB — EXTERNAL GROUP B STREP ANTIGEN: POSITIVE

## 2023-09-13 PROCEDURE — 0501F PRENATAL FLOW SHEET: CPT | Performed by: OBSTETRICS & GYNECOLOGY

## 2023-09-13 NOTE — PROGRESS NOTES
Initial ob visit     CC- Here for care of pregnancy        Shasta Rogers is a 38 y.o. female, , who presents for her first obstetrical visit.  Her last LMP was Patient's last menstrual period was 2023.. Her DARSHANA is 2024, by Last Menstrual Period. Current GA is 8w1d.     Initial positive test date : 2023, UPT        Her periods are: every 4 weeks  Prior obstetric issues: none  Patient's past medical history is significant for:  none .  Family history of genetic issues (includes FOB): none  Prior infections concerning in pregnancy (Rash, fever in last 2 weeks): No  Varicella Hx - history of chicken pox  Prior testing for Cystic Fibrosis Carrier or Sickle Cell Trait- none  Prepregnancy BMI - Body mass index is 32.25 kg/m².  History of STD: no  Hx of HSV for patient or partner: no  Ultrasound Today: yes    OB History    Para Term  AB Living   3 1 1   1 1   SAB IAB Ectopic Molar Multiple Live Births   1       0 1      # Outcome Date GA Lbr Nikko/2nd Weight Sex Delivery Anes PTL Lv   3 Current            2 SAB 10/01/22 10w0d          1 Term 19 37w6d 11:35 / 02:43 3110 g (6 lb 13.7 oz) F Vag-Spont EPI N KRIS       Additional Pertinent History   Last Pap : 2022  Result: negative HPV: not done     Last Completed Pap Smear            PAP SMEAR (Every 3 Years) Next due on 2022  SCANNED - PAP SMEAR    10/15/2020  Pap IG, HPV-hr    10/05/2020  SCANNED - PAP SMEAR                  History of abnormal Pap smear: yes - colpo 18 years ago  Family history of uterine, colon, breast, or ovarian cancer: yes - Ovarian cancer Maternal aunt and MGF colon cancer  Feelings of Anxiety or Depression: no  Tobacco Usage?: No   Alcohol/Drug Use?: NO  Over the age of 35 at delivery: yes  Desires Genetic Screening: reviewed    PMH    Current Outpatient Medications:     prenatal vitamin tablet, Take  by mouth Daily., Disp: , Rfl:      Past Medical History:   Diagnosis Date     "Abnormal Pap smear of cervix     Back pain     Ovarian cyst      (spontaneous vaginal delivery)         Past Surgical History:   Procedure Laterality Date    NO PAST SURGERIES         Review of Systems   Review of Systems    Patient Reports: Nausea and Fatigue  Patient Denies: Spotting, Heavy bleeding, and Cramping  All systems reviewed and otherwise normal.    I have reviewed and agree with the HPI, ROS, and historical information as entered above. Debora Prajapati MD      /80   Wt 85.3 kg (188 lb)   LMP 2023   BMI 32.25 kg/m²     The additional following portions of the patient's history were reviewed and updated as appropriate: allergies, current medications, past family history, past medical history, past social history, past surgical history, and problem list.    Physical Exam  General:  well developed; well nourished  no acute distress   Chest/Respiratory: No labored breathing, normal respiratory effort, normal appearance, no respiratory noises noted   Heart:  normal rate, regular rhythm,  no murmurs, rubs, or gallops   Thyroid: normal to inspection and palpation   Breasts:  Not performed.   Abdomen: soft, non-tender; no masses  no umbilical or inguinal hernias are present  no hepato-splenomegaly   Pelvis: Not performed.        Assessment and Plan    Problem List Items Addressed This Visit          Gravid and     AMA (advanced maternal age) multigravida 35+ - Primary    Obesity in pregnancy, antepartum    Overview     Hgb A1C at NOB  Early 1 hr gtt  Recommended limiting weight gain to 15-20lbs  Growth US at 32 and 37 wks  Baby asa 12-36wk         Relevant Orders    Hemoglobin A1c    Prenatal care, antepartum    Overview      at 37w6d ROM with JTA-6 lbs 13.7 oz- no complications \" Meenu\"         Relevant Orders    Obstetric Panel    HIV-1 / O / 2 Ag / Antibody    Urine Culture - Urine, Urine, Clean Catch    Urinalysis With Microscopic - Urine, Clean Catch    Chlamydia " trachomatis, Neisseria gonorrhoeae, PCR - Urine, Urine, Clean Catch       Pregnancy at 8w1d  Reviewed routine prenatal care with the office and educational materials given  Lab(s) Ordered  Discussed options for genetic testing including first trimester nuchal translucency screen, genetic disease carrier testing, quadruple screen, and NIPT  Discontinue the use of all non-medicinal drugs and chemicals  Patient is on Prenatal vitamins  Activity recommendation : 150 minutes/week of moderate intensity aerobic activity unless we limit for bleeding, hypertension or other pregnancy complication   Recommend limiting weight gain to 15 to 20 pounds in pregnancy.   Discussed carbohydrate control.   hgb A1C today  Recommended early 1 hr gtt next visit  discussed baby aspirin from 10-36 weeks for prevention of preeclampsia   Return in about 4 weeks (around 10/11/2023) for glucola.      Debora Prajapati MD  09/13/2023

## 2023-09-18 LAB
ABO GROUP BLD: ABNORMAL
APPEARANCE UR: ABNORMAL
BACTERIA #/AREA URNS HPF: ABNORMAL /[HPF]
BACTERIA UR CULT: ABNORMAL
BACTERIA UR CULT: ABNORMAL
BASOPHILS # BLD AUTO: 0.1 X10E3/UL (ref 0–0.2)
BASOPHILS NFR BLD AUTO: 1 %
BILIRUB UR QL STRIP: NEGATIVE
BLD GP AB SCN SERPL QL: NEGATIVE
C TRACH RRNA SPEC QL NAA+PROBE: NEGATIVE
CASTS URNS QL MICRO: ABNORMAL /LPF
COLOR UR: YELLOW
CRYSTALS URNS MICRO: ABNORMAL
EOSINOPHIL # BLD AUTO: 0.1 X10E3/UL (ref 0–0.4)
EOSINOPHIL NFR BLD AUTO: 1 %
EPI CELLS #/AREA URNS HPF: >10 /HPF (ref 0–10)
ERYTHROCYTE [DISTWIDTH] IN BLOOD BY AUTOMATED COUNT: 12.5 % (ref 11.7–15.4)
GLUCOSE UR QL STRIP: NEGATIVE
HBA1C MFR BLD: 5.3 % (ref 4.8–5.6)
HBV SURFACE AG SERPL QL IA: NEGATIVE
HCT VFR BLD AUTO: 40.9 % (ref 34–46.6)
HCV IGG SERPL QL IA: NON REACTIVE
HGB BLD-MCNC: 13.7 G/DL (ref 11.1–15.9)
HGB UR QL STRIP: NEGATIVE
HIV 1+2 AB+HIV1 P24 AG SERPL QL IA: NON REACTIVE
IMM GRANULOCYTES # BLD AUTO: 0 X10E3/UL (ref 0–0.1)
IMM GRANULOCYTES NFR BLD AUTO: 0 %
KETONES UR QL STRIP: NEGATIVE
LEUKOCYTE ESTERASE UR QL STRIP: NEGATIVE
LYMPHOCYTES # BLD AUTO: 2.3 X10E3/UL (ref 0.7–3.1)
LYMPHOCYTES NFR BLD AUTO: 24 %
MCH RBC QN AUTO: 29.9 PG (ref 26.6–33)
MCHC RBC AUTO-ENTMCNC: 33.5 G/DL (ref 31.5–35.7)
MCV RBC AUTO: 89 FL (ref 79–97)
MICRO URNS: ABNORMAL
MICRO URNS: ABNORMAL
MONOCYTES # BLD AUTO: 1 X10E3/UL (ref 0.1–0.9)
MONOCYTES NFR BLD AUTO: 11 %
MUCOUS THREADS URNS QL MICRO: PRESENT
N GONORRHOEA RRNA SPEC QL NAA+PROBE: NEGATIVE
NEUTROPHILS # BLD AUTO: 6 X10E3/UL (ref 1.4–7)
NEUTROPHILS NFR BLD AUTO: 63 %
NITRITE UR QL STRIP: NEGATIVE
PH UR STRIP: 7.5 [PH] (ref 5–7.5)
PLATELET # BLD AUTO: 340 X10E3/UL (ref 150–450)
PROT UR QL STRIP: NEGATIVE
RBC # BLD AUTO: 4.58 X10E6/UL (ref 3.77–5.28)
RBC #/AREA URNS HPF: ABNORMAL /HPF (ref 0–2)
RH BLD: POSITIVE
RPR SER QL: NON REACTIVE
RUBV IGG SERPL IA-ACNC: 1.17 INDEX
SP GR UR STRIP: 1.02 (ref 1–1.03)
UNIDENT CRYS URNS QL MICRO: PRESENT
UROBILINOGEN UR STRIP-MCNC: 0.2 MG/DL (ref 0.2–1)
WBC # BLD AUTO: 9.4 X10E3/UL (ref 3.4–10.8)
WBC #/AREA URNS HPF: ABNORMAL /HPF (ref 0–5)

## 2023-09-19 PROBLEM — O98.819 GROUP B STREPTOCOCCAL INFECTION DURING PREGNANCY: Status: ACTIVE | Noted: 2023-09-19

## 2023-09-19 PROBLEM — B95.1 GROUP B STREPTOCOCCAL INFECTION DURING PREGNANCY: Status: ACTIVE | Noted: 2023-09-19

## 2023-09-19 RX ORDER — CEPHALEXIN 500 MG/1
500 CAPSULE ORAL 2 TIMES DAILY
Qty: 14 CAPSULE | Refills: 0 | Status: SHIPPED | OUTPATIENT
Start: 2023-09-19 | End: 2023-09-26

## 2023-10-12 ENCOUNTER — LAB (OUTPATIENT)
Dept: OBSTETRICS AND GYNECOLOGY | Facility: CLINIC | Age: 38
End: 2023-10-12
Payer: COMMERCIAL

## 2023-10-12 DIAGNOSIS — Z34.91 FIRST TRIMESTER PREGNANCY: Primary | ICD-10-CM

## 2023-10-17 ENCOUNTER — ROUTINE PRENATAL (OUTPATIENT)
Dept: OBSTETRICS AND GYNECOLOGY | Facility: CLINIC | Age: 38
End: 2023-10-17
Payer: COMMERCIAL

## 2023-10-17 VITALS — SYSTOLIC BLOOD PRESSURE: 110 MMHG | DIASTOLIC BLOOD PRESSURE: 70 MMHG | BODY MASS INDEX: 32.53 KG/M2 | WEIGHT: 189.6 LBS

## 2023-10-17 DIAGNOSIS — O99.210 OBESITY IN PREGNANCY, ANTEPARTUM: ICD-10-CM

## 2023-10-17 DIAGNOSIS — O98.819 GROUP B STREPTOCOCCAL INFECTION DURING PREGNANCY: ICD-10-CM

## 2023-10-17 DIAGNOSIS — B95.1 GROUP B STREPTOCOCCAL INFECTION DURING PREGNANCY: ICD-10-CM

## 2023-10-17 DIAGNOSIS — Z34.90 PRENATAL CARE, ANTEPARTUM: Primary | ICD-10-CM

## 2023-10-17 DIAGNOSIS — O09.529 ANTEPARTUM MULTIGRAVIDA OF ADVANCED MATERNAL AGE: ICD-10-CM

## 2023-10-17 LAB
5P15 DELETION (CRI-DU-CHAT): NOT DETECTED
CFDNA.FET/CFDNA.TOTAL SFR FETUS: NORMAL %
CITATION REF LAB TEST: NORMAL
FET 13+18+21+X+Y ANEUP PLAS.CFDNA: NEGATIVE
FET 1P36 DEL RISK WBC.DNA+CFDNA QL: NOT DETECTED
FET 22Q11.2 DEL RISK WBC.DNA+CFDNA QL: NOT DETECTED
FET CHR 11Q23 DEL PLAS.CFDNA QL: NOT DETECTED
FET CHR 15Q11 DEL PLAS.CFDNA QL: NOT DETECTED
FET CHR 21 TS PLAS.CFDNA QL: NEGATIVE
FET CHR 4P16 DEL PLAS.CFDNA QL: NOT DETECTED
FET CHR 8Q24 DEL PLAS.CFDNA QL: NOT DETECTED
FET MS X RISK WBC.DNA+CFDNA QL: NOT DETECTED
FET SEX PLAS.CFDNA DOSAGE CFDNA: NORMAL
FET TS 13 RISK PLAS.CFDNA QL: NEGATIVE
FET TS 18 RISK WBC.DNA+CFDNA QL: NEGATIVE
FET X + Y ANEUP RISK PLAS.CFDNA SEQ-IMP: NOT DETECTED
GA EST FROM CONCEPTION DATE: NORMAL D
GESTATIONAL AGE > 9:: YES
GLUCOSE 1H P 50 G GLC PO SERPL-MCNC: 101 MG/DL (ref 65–139)
GLUCOSE UR STRIP-MCNC: NEGATIVE MG/DL
LAB DIRECTOR NAME PROVIDER: NORMAL
LAB DIRECTOR NAME PROVIDER: NORMAL
LABORATORY COMMENT REPORT: NORMAL
LIMITATIONS OF THE TEST: NORMAL
NEGATIVE PREDICTIVE VALUE: NORMAL
NOTE: NORMAL
PERFORMANCE CHARACTERISTICS: NORMAL
POSITIVE PREDICTIVE VALUE: NORMAL
PROT UR STRIP-MCNC: ABNORMAL MG/DL
REF LAB TEST METHOD: NORMAL
TEST PERFORMANCE INFO SPEC: NORMAL
TRIOSOMY 16: NOT DETECTED
TRISOMY 22: NOT DETECTED

## 2023-10-17 PROCEDURE — 0502F SUBSEQUENT PRENATAL CARE: CPT | Performed by: OBSTETRICS & GYNECOLOGY

## 2023-10-17 NOTE — PROGRESS NOTES
OB FOLLOW UP  CC- Here for care of pregnancy        Shasta Rogers is a 38 y.o.  13w0d patient being seen today for her obstetrical follow up visit. Patient reports occasional nausea that is improving.     Pt is undergoing early 1 hr gtt today. Lab due at 11:05.    Her prenatal care is complicated by (and status) :   Patient Active Problem List   Diagnosis    Pregnancy    AMA (advanced maternal age) multigravida 35+    Obesity in pregnancy, antepartum    Prenatal care, antepartum    Group B streptococcal infection during pregnancy       Desires genetic testing?:  already done, neg, boy  Flu Status: Declines or pt states she already had flu  Ultrasound Today: No    ROS -   Patient Denies: Loss of Fluid, Vaginal Spotting, Vision Changes, Headaches, and Vomiting   Fetal Movement : normal  All other systems reviewed and are negative.     The additional following portions of the patient's history were reviewed and updated as appropriate: allergies, current medications, past family history, past medical history, past social history, past surgical history, and problem list.    I have reviewed and agree with the HPI, ROS, and historical information as entered above. Debora Prajapati MD          /70   Wt 86 kg (189 lb 9.6 oz)   LMP 2023   BMI 32.53 kg/m²         EXAM:     Prenatal Vitals  BP: 110/70  Weight: 86 kg (189 lb 9.6 oz)   Fetal Heart Rate: 144          Urine Glucose Read-only: Negative  Urine Protein Read-only: (!) Trace       Assessment and Plan    Problem List Items Addressed This Visit          Gravid and     AMA (advanced maternal age) multigravida 35+    Overview     cfDNA negative.  Boy!         Obesity in pregnancy, antepartum    Overview     Hgb A1C at NOB  Early 1 hr gtt  Recommended limiting weight gain to 15-20lbs  Growth US at 32 and 37 wks  Baby asa 12-36wk         Relevant Orders    Gestational Screen 1 Hr ([facility])    Prenatal care, antepartum - Primary     "Overview      at 37w6d ROM with JTA-6 lbs 13.7 oz- no complications \" Meenu\"         Relevant Orders    POC Urinalysis Dipstick (Completed)    Group B streptococcal infection during pregnancy    Overview     In urine at NOB             Pregnancy at 13w0d  Labs reviewed from New OB Visit.  Early Glucola today.  Counseled on genetic testing, carrier status and option for NT screen  Activity and Exercise discussed.  Patient is on Prenatal vitamins  Reviewed baby aspirin weeks 12 through 36.  Return in about 4 weeks (around 2023).    Debora Prajapati MD  10/17/2023   "

## 2023-10-19 ENCOUNTER — TELEPHONE (OUTPATIENT)
Dept: OBSTETRICS AND GYNECOLOGY | Facility: CLINIC | Age: 38
End: 2023-10-19
Payer: COMMERCIAL

## 2023-10-19 RX ORDER — CEPHALEXIN 500 MG/1
500 CAPSULE ORAL 2 TIMES DAILY
Qty: 14 CAPSULE | Refills: 0 | Status: SHIPPED | OUTPATIENT
Start: 2023-10-19 | End: 2023-10-26

## 2023-10-19 NOTE — TELEPHONE ENCOUNTER
Pt. Had a urine culture with GBS present. The labs were sent to [facility] instead of [facility]. [facility] sent me a message that this had happened and he went ahead and sent in Keflex for treatment and to add it to her problem list. I added it to the problem list but evidently did not contact the patient. I spoke with the patient and explained the situation and apologized for the oversight. Resent in her Keflex as the pharmacy had cancelled the previous Rx. We also reviewed the results from her last visit that the protein in the urine likely represented some dehydration. She VU.

## 2023-10-19 NOTE — TELEPHONE ENCOUNTER
Called patient reviewed urine culture results with her.  ordered keflex to her pharmacy already sent in. VU.

## 2023-10-19 NOTE — TELEPHONE ENCOUNTER
Patient is calling to go over lab results from 10/17/23 and 9/13/23. Patient did just speak to [facility] but Patient wants to know about both lab dates. Since she hasn't been notified about both.

## 2023-11-13 PROBLEM — Z34.90 PREGNANCY: Status: RESOLVED | Noted: 2022-09-09 | Resolved: 2023-11-13

## 2023-11-14 ENCOUNTER — ROUTINE PRENATAL (OUTPATIENT)
Dept: OBSTETRICS AND GYNECOLOGY | Facility: CLINIC | Age: 38
End: 2023-11-14
Payer: COMMERCIAL

## 2023-11-14 ENCOUNTER — TELEPHONE (OUTPATIENT)
Dept: OBSTETRICS AND GYNECOLOGY | Facility: CLINIC | Age: 38
End: 2023-11-14

## 2023-11-14 VITALS — DIASTOLIC BLOOD PRESSURE: 70 MMHG | SYSTOLIC BLOOD PRESSURE: 102 MMHG | BODY MASS INDEX: 33.11 KG/M2 | WEIGHT: 193 LBS

## 2023-11-14 DIAGNOSIS — M54.32 SCIATICA OF LEFT SIDE: ICD-10-CM

## 2023-11-14 DIAGNOSIS — Z34.90 PRENATAL CARE, ANTEPARTUM: ICD-10-CM

## 2023-11-14 DIAGNOSIS — O99.210 OBESITY IN PREGNANCY, ANTEPARTUM: ICD-10-CM

## 2023-11-14 DIAGNOSIS — O09.529 ANTEPARTUM MULTIGRAVIDA OF ADVANCED MATERNAL AGE: Primary | ICD-10-CM

## 2023-11-14 DIAGNOSIS — O98.819 GROUP B STREPTOCOCCAL INFECTION DURING PREGNANCY: ICD-10-CM

## 2023-11-14 DIAGNOSIS — B95.1 GROUP B STREPTOCOCCAL INFECTION DURING PREGNANCY: ICD-10-CM

## 2023-11-14 LAB
GLUCOSE UR STRIP-MCNC: NEGATIVE MG/DL
PROT UR STRIP-MCNC: NEGATIVE MG/DL

## 2023-11-14 RX ORDER — ASPIRIN 81 MG/1
81 TABLET ORAL DAILY
COMMUNITY

## 2023-11-14 NOTE — PROGRESS NOTES
OB FOLLOW UP  CC- Here for care of pregnancy        Shasta Rogers is a 38 y.o.  17w0d patient being seen today for her obstetrical follow up visit. Patient reports sciatic nerve pain for the last few weeks that goes down her left leg.     Her prenatal care is complicated by (and status) :   Patient Active Problem List   Diagnosis    AMA (advanced maternal age) multigravida 35+    Obesity in pregnancy, antepartum    Prenatal care, antepartum    Group B streptococcal infection during pregnancy       Flu Status: Declines  Ultrasound Today: No    AFP: declines    ROS -   Patient Reports :  sciatic nerve pain  Patient Denies: Loss of Fluid, Vaginal Spotting, Vision Changes, Headaches, Nausea , and Vomiting   Fetal Movement : normal  All other systems reviewed and are negative.       The additional following portions of the patient's history were reviewed and updated as appropriate: allergies, current medications, past family history, past medical history, past social history, past surgical history, and problem list.      I have reviewed and agree with the HPI, ROS, and historical information as entered above. MARGARITA Keller          EXAM:     Prenatal Vitals  BP: 102/70  Weight: 87.5 kg (193 lb)   Fetal Heart Rate: 141   Pelvic Exam:        Urine Glucose Read-only: Negative  Urine Protein Read-only: Negative           Assessment and Plan    Problem List Items Addressed This Visit       AMA (advanced maternal age) multigravida 35+ - Primary    Overview     cfDNA negative.  Boy!  On daily baby aspirin. [facility] anatomy         Relevant Orders    US Ob 14 + Weeks Single or First Gestation    [facility]    Obesity in pregnancy, antepartum    Overview     Hgb A1C at NOB 5.3  Early 1 hr gtt 101  Recommended limiting weight gain to 15-20lbs  Growth US at 32 and 37 wks  Baby asa 12-36wk         Prenatal care, antepartum    Overview      at 37w6d ROM with JTA-6 lbs 13.7 oz- no complications  "\" Meenu\"         Relevant Orders    POC Urinalysis Dipstick (Completed)    Group B streptococcal infection during pregnancy    Overview     In urine at NOB           Other Visit Diagnoses       Sciatica of left side        Relevant Orders    Ambulatory Referral to Physical Therapy Evaluate and treat (Completed)            Pregnancy at 17w0d  Fetal status reassuring.   Discussed sciatica- recommended PT and a back brace. Ordered from [facility].   Counseled on MSAFP alone in relation to OTD and placental issues. Declines.   Anatomy scan next visit at [facility] due to AMA. She is on the baby aspirin daily.  Activity and Exercise discussed.  U/S ordered at follow up  Patient is on Prenatal vitamins  Discussed bASA for PIH prevention from 12 to 36wk  Return in about 3 weeks (around 12/5/2023) for [facility] also [facility] anatomy.    Mita Blankenship, APRN  11/14/2023    "

## 2023-11-14 NOTE — TELEPHONE ENCOUNTER
Spoke with Cristo from [facility], states they need medical notes and insurance information in order to get patient the back brace. She would like information faxed to [phone].

## 2023-11-14 NOTE — TELEPHONE ENCOUNTER
Cristo from [facility] is calling regarding prescription sent over today for this pt's back brace.They are requesting that documentation regarding why the pt needs this brace be sent to them along with encounter notes/insurance info before prescription can be filled. Call back number is [phone] fax number [phone]

## 2023-12-04 ENCOUNTER — HOSPITAL ENCOUNTER (OUTPATIENT)
Dept: WOMENS IMAGING | Facility: HOSPITAL | Age: 38
Discharge: HOME | End: 2023-12-04
Admitting: ADVANCED PRACTICE MIDWIFE
Payer: COMMERCIAL

## 2023-12-04 ENCOUNTER — OFFICE VISIT (OUTPATIENT)
Dept: OBSTETRICS AND GYNECOLOGY | Facility: HOSPITAL | Age: 38
End: 2023-12-04
Payer: COMMERCIAL

## 2023-12-04 ENCOUNTER — ROUTINE PRENATAL (OUTPATIENT)
Dept: OBSTETRICS AND GYNECOLOGY | Facility: CLINIC | Age: 38
End: 2023-12-04
Payer: COMMERCIAL

## 2023-12-04 VITALS
DIASTOLIC BLOOD PRESSURE: 71 MMHG | WEIGHT: 196 LBS | SYSTOLIC BLOOD PRESSURE: 112 MMHG | BODY MASS INDEX: 31.5 KG/M2 | HEIGHT: 66 IN

## 2023-12-04 VITALS — WEIGHT: 194.8 LBS | SYSTOLIC BLOOD PRESSURE: 118 MMHG | BODY MASS INDEX: 31.92 KG/M2 | DIASTOLIC BLOOD PRESSURE: 72 MMHG

## 2023-12-04 DIAGNOSIS — O09.529 ANTEPARTUM MULTIGRAVIDA OF ADVANCED MATERNAL AGE: Primary | ICD-10-CM

## 2023-12-04 DIAGNOSIS — O98.819 GROUP B STREPTOCOCCAL INFECTION DURING PREGNANCY: ICD-10-CM

## 2023-12-04 DIAGNOSIS — O99.210 OBESITY IN PREGNANCY, ANTEPARTUM: ICD-10-CM

## 2023-12-04 DIAGNOSIS — O09.529 ANTEPARTUM MULTIGRAVIDA OF ADVANCED MATERNAL AGE: ICD-10-CM

## 2023-12-04 DIAGNOSIS — Z34.90 PRENATAL CARE, ANTEPARTUM: ICD-10-CM

## 2023-12-04 DIAGNOSIS — B95.1 GROUP B STREPTOCOCCAL INFECTION DURING PREGNANCY: ICD-10-CM

## 2023-12-04 DIAGNOSIS — Z3A.19 19 WEEKS GESTATION OF PREGNANCY: ICD-10-CM

## 2023-12-04 LAB
GLUCOSE UR STRIP-MCNC: NEGATIVE MG/DL
PROT UR STRIP-MCNC: NEGATIVE MG/DL

## 2023-12-04 PROCEDURE — 0502F SUBSEQUENT PRENATAL CARE: CPT | Performed by: OBSTETRICS & GYNECOLOGY

## 2023-12-04 PROCEDURE — 76811 OB US DETAILED SNGL FETUS: CPT | Performed by: OBSTETRICS & GYNECOLOGY

## 2023-12-04 PROCEDURE — 76811 OB US DETAILED SNGL FETUS: CPT

## 2023-12-04 NOTE — PROGRESS NOTES
"    OB FOLLOW UP  CC- Here for care of pregnancy        Shasta Rogers is a 38 y.o.  19w6d patient being seen today for her obstetrical follow up visit. Patient reports no complaints.      Her prenatal care is complicated by (and status) :   Patient Active Problem List   Diagnosis    AMA (advanced maternal age) multigravida 35+    Obesity in pregnancy, antepartum    Prenatal care, antepartum    Group B streptococcal infection during pregnancy       Flu Status: Declines  Ultrasound Today: Yes at [facility]. Report pending. Pt states follow up in 4 weeks, incomplete, needs more views.     ROS -   Patient Denies: Loss of Fluid, Vaginal Spotting, Vision Changes, Headaches, Nausea , Vomiting , Contractions, and Epigastric pain  Fetal Movement : normal  All other systems reviewed and are negative.       The additional following portions of the patient's history were reviewed and updated as appropriate: allergies, current medications, past family history, past medical history, past social history, past surgical history, and problem list.      I have reviewed and agree with the HPI, ROS, and historical information as entered above. Debora Prajapati MD      /72   Wt 88.4 kg (194 lb 12.8 oz)   LMP 2023   BMI 31.92 kg/m²       EXAM:     Prenatal Vitals  BP: 118/72  Weight: 88.4 kg (194 lb 12.8 oz)              Urine Glucose Read-only: Negative  Urine Protein Read-only: Negative       Assessment and Plan    Problem List Items Addressed This Visit          Gravid and     AMA (advanced maternal age) multigravida 35+ - Primary    Overview     cfDNA negative.  Boy!  On daily baby aspirin. [facility] anatomy         Obesity in pregnancy, antepartum    Overview     Hgb A1C at NOB 5.3  Early 1 hr gtt 101  Recommended limiting weight gain to 15-20lbs  Growth US at 32 and 37 wks  Baby asa 12-36wk         Prenatal care, antepartum    Overview      at 37w6d ROM with JTA-6 lbs 13.7 oz- no complications \" Meenu\"   "       Relevant Orders    POC Urinalysis Dipstick (Completed)    Group B streptococcal infection during pregnancy    Overview     In urine at NOB             Pregnancy at 19w6d  Anatomy scan today is  [facility] report pending today.  Fetal status reassuring.   Activity and Exercise discussed.  Patient is on Prenatal vitamins  Return in about 4 weeks (around 1/1/2024) for [facility] same day.    Debora Prajapati MD  12/04/2023

## 2023-12-04 NOTE — PROGRESS NOTES
Patient seen in Maternal Fetal Medicine clinic today. Please see full note in under imaging tab of patient chart in Epic (Viewpoint report).    Lisa Joya MD

## 2024-01-04 ENCOUNTER — ROUTINE PRENATAL (OUTPATIENT)
Dept: OBSTETRICS AND GYNECOLOGY | Facility: CLINIC | Age: 39
End: 2024-01-04
Payer: COMMERCIAL

## 2024-01-04 ENCOUNTER — HOSPITAL ENCOUNTER (OUTPATIENT)
Dept: WOMENS IMAGING | Facility: HOSPITAL | Age: 39
Discharge: HOME | End: 2024-01-04
Admitting: OBSTETRICS & GYNECOLOGY
Payer: COMMERCIAL

## 2024-01-04 ENCOUNTER — OFFICE VISIT (OUTPATIENT)
Dept: OBSTETRICS AND GYNECOLOGY | Facility: HOSPITAL | Age: 39
End: 2024-01-04
Payer: COMMERCIAL

## 2024-01-04 VITALS — WEIGHT: 197 LBS | BODY MASS INDEX: 32.28 KG/M2 | DIASTOLIC BLOOD PRESSURE: 76 MMHG | SYSTOLIC BLOOD PRESSURE: 120 MMHG

## 2024-01-04 VITALS — WEIGHT: 198 LBS | BODY MASS INDEX: 32.45 KG/M2 | SYSTOLIC BLOOD PRESSURE: 133 MMHG | DIASTOLIC BLOOD PRESSURE: 78 MMHG

## 2024-01-04 DIAGNOSIS — B95.1 GROUP B STREPTOCOCCAL INFECTION DURING PREGNANCY: ICD-10-CM

## 2024-01-04 DIAGNOSIS — O99.210 OBESITY IN PREGNANCY, ANTEPARTUM: ICD-10-CM

## 2024-01-04 DIAGNOSIS — O09.529 ANTEPARTUM MULTIGRAVIDA OF ADVANCED MATERNAL AGE: Primary | ICD-10-CM

## 2024-01-04 DIAGNOSIS — Z34.90 PRENATAL CARE, ANTEPARTUM: ICD-10-CM

## 2024-01-04 DIAGNOSIS — Z3A.24 24 WEEKS GESTATION OF PREGNANCY: ICD-10-CM

## 2024-01-04 DIAGNOSIS — O98.819 GROUP B STREPTOCOCCAL INFECTION DURING PREGNANCY: ICD-10-CM

## 2024-01-04 DIAGNOSIS — O09.529 ANTEPARTUM MULTIGRAVIDA OF ADVANCED MATERNAL AGE: ICD-10-CM

## 2024-01-04 LAB
GLUCOSE UR STRIP-MCNC: NEGATIVE MG/DL
PROT UR STRIP-MCNC: NEGATIVE MG/DL

## 2024-01-04 PROCEDURE — 76816 OB US FOLLOW-UP PER FETUS: CPT

## 2024-01-04 NOTE — PROGRESS NOTES
"    OB FOLLOW UP  CC- Here for care of pregnancy        Shasta Rogers is a 38 y.o.  24w2d patient being seen today for her obstetrical follow up visit. Patient reports mild cramping.     Her prenatal care is complicated by (and status) :   Patient Active Problem List   Diagnosis    AMA (advanced maternal age) multigravida 35+    Obesity in pregnancy, antepartum    Prenatal care, antepartum    Group B streptococcal infection during pregnancy       Flu Status: Declines  Ultrasound Today: Yes at [facility], report is pending. Per patient everything was normal. Seeing [facility] again in 8 weeks.    ROS -   Patient Reports : Cramping  Patient Denies: Loss of Fluid, Vaginal Spotting, Vision Changes, and Headaches  Fetal Movement : normal  All other systems reviewed and are negative.       The additional following portions of the patient's history were reviewed and updated as appropriate: allergies, current medications, past family history, past medical history, past social history, past surgical history, and problem list.      I have reviewed and agree with the HPI, ROS, and historical information as entered above. Debora Prajapati MD      /76   Wt 89.4 kg (197 lb)   LMP 2023   BMI 32.28 kg/m²       EXAM:     Prenatal Vitals  BP: 120/76  Weight: 89.4 kg (197 lb)                   Urine Glucose Read-only: Negative  Urine Protein Read-only: Negative       Assessment and Plan    Problem List Items Addressed This Visit          Gravid and     AMA (advanced maternal age) multigravida 35+ - Primary    Overview     cfDNA negative.  Boy!  On daily baby aspirin. [facility] anatomy         Obesity in pregnancy, antepartum    Overview     Hgb A1C at NOB 5.3  Early 1 hr gtt 101  Recommended limiting weight gain to 15-20lbs  Growth US at 32 and 37 wks  Baby asa 12-36wk         Prenatal care, antepartum    Overview      at 37w6d ROM with JTA-6 lbs 13.7 oz- no complications \" Meenu\"         Relevant Orders    POC " Urinalysis Dipstick (Completed)    Group B streptococcal infection during pregnancy    Overview     In urine at NOB             Pregnancy at 24w2d  Fetal status reassuring.  [facility] scan reviewed today.  1 hour gtt, CBC, Antibody screen, and TDAP next visit. Instructions given  Discussed/encouraged TDAP vaccination after 28 weeks  Activity and Exercise discussed.  Return in about 4 weeks (around 2/1/2024) for glucola.      Debora Prajapati MD  01/04/2024

## 2024-01-04 NOTE — PROGRESS NOTES
Patient denies bleeding, leaking fluid or contractions  NIPT negative  Patients next follow up with Dr. Prajapati's office is today.

## 2024-02-01 ENCOUNTER — ROUTINE PRENATAL (OUTPATIENT)
Dept: OBSTETRICS AND GYNECOLOGY | Facility: CLINIC | Age: 39
End: 2024-02-01
Payer: COMMERCIAL

## 2024-02-01 VITALS — DIASTOLIC BLOOD PRESSURE: 68 MMHG | WEIGHT: 202 LBS | SYSTOLIC BLOOD PRESSURE: 112 MMHG | BODY MASS INDEX: 33.1 KG/M2

## 2024-02-01 DIAGNOSIS — B95.1 GROUP B STREPTOCOCCAL INFECTION DURING PREGNANCY: ICD-10-CM

## 2024-02-01 DIAGNOSIS — Z34.90 PRENATAL CARE, ANTEPARTUM: Primary | ICD-10-CM

## 2024-02-01 DIAGNOSIS — O98.819 GROUP B STREPTOCOCCAL INFECTION DURING PREGNANCY: ICD-10-CM

## 2024-02-01 DIAGNOSIS — O09.529 ANTEPARTUM MULTIGRAVIDA OF ADVANCED MATERNAL AGE: ICD-10-CM

## 2024-02-01 DIAGNOSIS — O99.210 OBESITY IN PREGNANCY, ANTEPARTUM: ICD-10-CM

## 2024-02-01 LAB
ERYTHROCYTE [DISTWIDTH] IN BLOOD BY AUTOMATED COUNT: 12.4 % (ref 12.3–15.4)
GLUCOSE 1H P 50 G GLC PO SERPL-MCNC: 93 MG/DL (ref 65–139)
GLUCOSE UR STRIP-MCNC: NEGATIVE MG/DL
HCT VFR BLD AUTO: 36.5 % (ref 34–46.6)
HGB BLD-MCNC: 12.3 G/DL (ref 12–15.9)
MCH RBC QN AUTO: 30.1 PG (ref 26.6–33)
MCHC RBC AUTO-ENTMCNC: 33.7 G/DL (ref 31.5–35.7)
MCV RBC AUTO: 89.2 FL (ref 79–97)
PLATELET # BLD AUTO: 332 10*3/MM3 (ref 140–450)
PROT UR STRIP-MCNC: NEGATIVE MG/DL
RBC # BLD AUTO: 4.09 10*6/MM3 (ref 3.77–5.28)
WBC # BLD AUTO: 10.66 10*3/MM3 (ref 3.4–10.8)

## 2024-02-01 NOTE — PROGRESS NOTES
OB FOLLOW UP  CC- Here for care of pregnancy        Shasta Rogers is a 39 y.o.  28w2d patient being seen today for her obstetrical follow up. Patient reports no complaints.     Patient undergoing Glucola testing today. She is due for her testing at 11:10.       MBT: A+  Rhogam: is not indicated.  28 week packet: reviewed with patient , counseled on fetal movement , pediatrician list reviewed, breast pump discussed, and childbirth classes reviewed  TDAP: will receive next visit  Flu Status: Declines  Ultrasound Today: No    Her prenatal care is complicated by (and status) :   Patient Active Problem List   Diagnosis    AMA (advanced maternal age) multigravida 35+    Obesity in pregnancy, antepartum    Prenatal care, antepartum    Group B streptococcal infection during pregnancy         ROS -   Patient Denies: Loss of Fluid, Vaginal Spotting, Vision Changes, Headaches, Nausea , Vomiting , Contractions, Epigastric pain, and skin itching  Fetal Movement : normal    The additional following portions of the patient's history were reviewed and updated as appropriate: allergies, current medications, past family history, past medical history, past surgical history, and problem list.    I have reviewed and agree with the HPI, ROS, and historical information as entered above. Kelle Brasher, APRN      /68   Wt 91.6 kg (202 lb)   LMP 2023   BMI 33.10 kg/m²         EXAM:     Prenatal Vitals  BP: 112/68  Weight: 91.6 kg (202 lb)   Fetal Heart Rate: 148      Fundal Height (cm): 30 cm        Urine Glucose Read-only: Negative  Urine Protein Read-only: Negative         Assessment and Plan    Problem List Items Addressed This Visit       AMA (advanced maternal age) multigravida 35+    Overview     cfDNA negative.  Boy!  On daily baby aspirin. [facility] anatomy         Obesity in pregnancy, antepartum    Overview     Hgb A1C at NOB 5.3  Early 1 hr gtt 101  Recommended limiting weight gain to  "15-20lbs  Growth US at 32 and 37 wks  Baby asa 12-36wk         Prenatal care, antepartum - Primary    Overview      at 37w6d ROM with JTA-6 lbs 13.7 oz- no complications \" Meenu\"         Relevant Orders    POC Urinalysis Dipstick (Completed)    CBC (No Diff)    Gestational Screen 1 Hr ([facility])    Antibody Screen    RPR    Group B streptococcal infection during pregnancy    Overview     In urine at NOB             Pregnancy at 28w2d  Fetal status reassuring.  1 hr Glucola, CBC, RPR. Antibody screen and TDAP next visit  Fetal movement/PTL or Labor precautions  Lab(s) Ordered  Patient is on Prenatal vitamins  Discussed bASA for PIH prevention from 12 to 36wk  Activity and Exercise discussed.  Return in about 4 weeks (around 2024) for After [facility].        Kelle Brasher, APRN  2024   "

## 2024-02-02 LAB
BLD GP AB SCN SERPL QL: NEGATIVE
RPR SER QL: NON REACTIVE

## 2024-02-09 ENCOUNTER — TELEPHONE (OUTPATIENT)
Dept: OBSTETRICS AND GYNECOLOGY | Facility: CLINIC | Age: 39
End: 2024-02-09
Payer: COMMERCIAL

## 2024-02-09 NOTE — TELEPHONE ENCOUNTER
Pt was seen at Urgent Care this morning and was prescribed Amoxicillin for a sinus infection. Would like to make sure it is ok for her to take this.

## 2024-02-29 ENCOUNTER — HOSPITAL ENCOUNTER (OUTPATIENT)
Dept: WOMENS IMAGING | Facility: HOSPITAL | Age: 39
Discharge: HOME | End: 2024-02-29
Admitting: OBSTETRICS & GYNECOLOGY
Payer: COMMERCIAL

## 2024-02-29 ENCOUNTER — ROUTINE PRENATAL (OUTPATIENT)
Dept: OBSTETRICS AND GYNECOLOGY | Facility: CLINIC | Age: 39
End: 2024-02-29
Payer: COMMERCIAL

## 2024-02-29 ENCOUNTER — OFFICE VISIT (OUTPATIENT)
Dept: OBSTETRICS AND GYNECOLOGY | Facility: HOSPITAL | Age: 39
End: 2024-02-29
Payer: COMMERCIAL

## 2024-02-29 VITALS — BODY MASS INDEX: 33.43 KG/M2 | WEIGHT: 204 LBS | DIASTOLIC BLOOD PRESSURE: 74 MMHG | SYSTOLIC BLOOD PRESSURE: 127 MMHG

## 2024-02-29 VITALS — DIASTOLIC BLOOD PRESSURE: 78 MMHG | WEIGHT: 203.4 LBS | BODY MASS INDEX: 33.33 KG/M2 | SYSTOLIC BLOOD PRESSURE: 118 MMHG

## 2024-02-29 DIAGNOSIS — O98.819 GROUP B STREPTOCOCCAL INFECTION DURING PREGNANCY: ICD-10-CM

## 2024-02-29 DIAGNOSIS — O09.529 ANTEPARTUM MULTIGRAVIDA OF ADVANCED MATERNAL AGE: ICD-10-CM

## 2024-02-29 DIAGNOSIS — Z34.90 PREGNANCY, UNSPECIFIED GESTATIONAL AGE: ICD-10-CM

## 2024-02-29 DIAGNOSIS — O99.210 OBESITY IN PREGNANCY, ANTEPARTUM: ICD-10-CM

## 2024-02-29 DIAGNOSIS — O09.529 ANTEPARTUM MULTIGRAVIDA OF ADVANCED MATERNAL AGE: Primary | ICD-10-CM

## 2024-02-29 DIAGNOSIS — Z34.90 PRENATAL CARE, ANTEPARTUM: ICD-10-CM

## 2024-02-29 DIAGNOSIS — B95.1 GROUP B STREPTOCOCCAL INFECTION DURING PREGNANCY: ICD-10-CM

## 2024-02-29 LAB
GLUCOSE UR STRIP-MCNC: NEGATIVE MG/DL
PROT UR STRIP-MCNC: NEGATIVE MG/DL

## 2024-02-29 PROCEDURE — 76816 OB US FOLLOW-UP PER FETUS: CPT

## 2024-02-29 PROCEDURE — 76819 FETAL BIOPHYS PROFIL W/O NST: CPT

## 2024-02-29 NOTE — PROGRESS NOTES
OB FOLLOW UP  CC- Here for care of pregnancy        Shasta Rogers is a 39 y.o.  32w2d patient being seen today for her obstetrical follow up visit. Patient reports occasional sharp pain in lower abdominal area, this occurred 3 times lasting few seconds. Pt reports mucousy nasal drainage that has been occurring for months.     Her prenatal care is complicated by (and status) :    Patient Active Problem List   Diagnosis    AMA (advanced maternal age) multigravida 35+    Obesity in pregnancy, antepartum    Prenatal care, antepartum    Group B streptococcal infection during pregnancy       Flu Status: Declines  TDAP status: given today  Rhogam status: was not indicated  28 week labs: Reviewed and normal  Ultrasound Today: Yes at [facility]. Report in chart. Baby possibly measuring big.   Non Stress Test: No.      ROS -   Patient Denies: Loss of Fluid, Vaginal Spotting, Vision Changes, Headaches, Nausea , Vomiting , Epigastric pain, and skin itching  Fetal Movement : normal  All other systems reviewed and are negative.       The additional following portions of the patient's history were reviewed and updated as appropriate: allergies and current medications.    I have reviewed and agree with the HPI, ROS, and historical information as entered above. Debora Prajapati MD      /78   Wt 92.3 kg (203 lb 6.4 oz)   LMP 2023   BMI 33.33 kg/m²         EXAM:     Prenatal Vitals  BP: 118/78  Weight: 92.3 kg (203 lb 6.4 oz)   Fetal Heart Rate: usg               Urine Glucose Read-only: Negative  Urine Protein Read-only: Negative           Assessment and Plan    Problem List Items Addressed This Visit          Gravid and     AMA (advanced maternal age) multigravida 35+ - Primary    Overview     cfDNA negative.  Boy!  On daily baby aspirin. 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 Is this something we should do?   "pregnancy, antepartum    Overview     Hgb A1C at NOB 5.3  Early 1 hr gtt 101  Recommended limiting weight gain to 15-20lbs  Growth US at 32 at [facility] 4 pounds 8 ounces which is 63rd percentile  Baby asa 12-36wk  Repeat growth here at 37 weeks.  Order in         Relevant Orders    US Ob Follow Up Transabdominal Approach    US Fetal Biophysical Profile;Without Non-Stress Testing    Prenatal care, antepartum    Overview      at 37w6d ROM with JTA-6 lbs 13.7 oz- no complications \" Meenu\"         Relevant Orders    POC Urinalysis Dipstick (Completed)    Tdap Vaccine Greater Than or Equal To 6yo IM (Completed)    Group B streptococcal infection during pregnancy    Overview     In urine at NOB             Pregnancy at 32w2d  Fetal status reassuring.  28 week labs reviewed.    Activity and Exercise discussed.  Fetal movement/PTL or Labor precautions  Tdap today.  Return in about 2 weeks (around 3/14/2024) for Please also schedule appointment with ultrasound in 5 weeks..    Debora Prajapati MD  2024   "

## 2024-03-11 ENCOUNTER — ROUTINE PRENATAL (OUTPATIENT)
Dept: OBSTETRICS AND GYNECOLOGY | Facility: CLINIC | Age: 39
End: 2024-03-11
Payer: COMMERCIAL

## 2024-03-11 VITALS — SYSTOLIC BLOOD PRESSURE: 110 MMHG | BODY MASS INDEX: 33.92 KG/M2 | WEIGHT: 207 LBS | DIASTOLIC BLOOD PRESSURE: 78 MMHG

## 2024-03-11 DIAGNOSIS — Z34.90 PRENATAL CARE, ANTEPARTUM: ICD-10-CM

## 2024-03-11 DIAGNOSIS — Z34.93 THIRD TRIMESTER PREGNANCY: Primary | ICD-10-CM

## 2024-03-11 DIAGNOSIS — O99.210 OBESITY IN PREGNANCY, ANTEPARTUM: ICD-10-CM

## 2024-03-11 DIAGNOSIS — O09.529 ANTEPARTUM MULTIGRAVIDA OF ADVANCED MATERNAL AGE: ICD-10-CM

## 2024-03-11 DIAGNOSIS — O98.819 GROUP B STREPTOCOCCAL INFECTION DURING PREGNANCY: ICD-10-CM

## 2024-03-11 DIAGNOSIS — B95.1 GROUP B STREPTOCOCCAL INFECTION DURING PREGNANCY: ICD-10-CM

## 2024-03-11 LAB
GLUCOSE UR STRIP-MCNC: NEGATIVE MG/DL
PROT UR STRIP-MCNC: NEGATIVE MG/DL

## 2024-03-11 PROCEDURE — 0502F SUBSEQUENT PRENATAL CARE: CPT | Performed by: NURSE PRACTITIONER

## 2024-03-11 NOTE — PROGRESS NOTES
OB FOLLOW UP  CC- Here for care of pregnancy        Shasta Rogers is a 39 y.o.  33w6d patient being seen today for her obstetrical follow up visit. Patient reports slight HA w/out vision changes and Tylenol improves. Good fm, no ctx.    Her prenatal care is complicated by (and status) :    Patient Active Problem List   Diagnosis    AMA (advanced maternal age) multigravida 35+    Obesity in pregnancy, antepartum    Prenatal care, antepartum    Group B streptococcal infection during pregnancy       Flu Status: Declines  TDAP status: declines  Rhogam status: was not indicated  28 week labs: Reviewed and normal  Ultrasound Today: No  Non Stress Test: No.      ROS -   Patient Denies: Loss of Fluid, Vaginal Spotting, Vision Changes, Nausea , Vomiting , Contractions, Epigastric pain, and skin itching  Fetal Movement : normal  All other systems reviewed and are negative.     The additional following portions of the patient's history were reviewed and updated as appropriate: allergies, current medications, past family history, past medical history, past social history, past surgical history, and problem list.    I have reviewed and agree with the HPI, ROS, and historical information as entered above. Kelle Brasher, APRN    /78   Wt 93.9 kg (207 lb)   LMP 2023   BMI 33.92 kg/m²       EXAM:     Prenatal Vitals  BP: 110/78  Weight: 93.9 kg (207 lb)   Fetal Heart Rate: pos        Urine Glucose Read-only: Negative  Urine Protein Read-only: Negative       Assessment and Plan    Problem List Items Addressed This Visit       AMA (advanced maternal age) multigravida 35+    Overview     cfDNA negative.  Boy!  On daily baby aspirin. [facility] anatomy         Obesity in pregnancy, antepartum    Overview     Hgb A1C at NOB 5.3  Early 1 hr gtt 101  Recommended limiting weight gain to 15-20lbs  Growth US at 32 at [facility] 4 pounds 8 ounces which is 63rd percentile  Baby asa 12-36wk  Repeat growth here at 37  "weeks.  Order in         Prenatal care, antepartum    Overview      at 37w6d ROM with JTA-6 lbs 13.7 oz- no complications \" Meneu\"         Group B streptococcal infection during pregnancy    Overview     In urine at NOB no allergy to pcn          Other Visit Diagnoses       Third trimester pregnancy    -  Primary    Relevant Orders    POC Urinalysis Dipstick (Completed)            Pregnancy at 33w6d  Fetal status reassuring.  Activity and Exercise discussed.  Fetal movement/PTL or Labor precautions  U/S ordered at follow up  Patient is on Prenatal vitamins  Reviewed Pre-eclampsia signs/symptoms  Discussed bASA for PIH prevention from 12 to 36wk  Return in about 17 days (around 3/28/2024) for Ultrasound.for repeat growth.  Pt would like to schedule IOL for 39 weeks (week of 4/15). Message sent to provider and .     Kelle Brasher, APRN  2024  "

## 2024-03-18 ENCOUNTER — ROUTINE PRENATAL (OUTPATIENT)
Dept: OBSTETRICS AND GYNECOLOGY | Facility: CLINIC | Age: 39
End: 2024-03-18
Payer: COMMERCIAL

## 2024-03-18 ENCOUNTER — TELEPHONE (OUTPATIENT)
Dept: OBSTETRICS AND GYNECOLOGY | Facility: CLINIC | Age: 39
End: 2024-03-18
Payer: COMMERCIAL

## 2024-03-18 VITALS — DIASTOLIC BLOOD PRESSURE: 90 MMHG | BODY MASS INDEX: 34.02 KG/M2 | WEIGHT: 207.6 LBS | SYSTOLIC BLOOD PRESSURE: 138 MMHG

## 2024-03-18 DIAGNOSIS — O09.529 ANTEPARTUM MULTIGRAVIDA OF ADVANCED MATERNAL AGE: ICD-10-CM

## 2024-03-18 DIAGNOSIS — R03.0 ELEVATED BP WITHOUT DIAGNOSIS OF HYPERTENSION: ICD-10-CM

## 2024-03-18 DIAGNOSIS — B95.1 GROUP B STREPTOCOCCAL INFECTION DURING PREGNANCY: ICD-10-CM

## 2024-03-18 DIAGNOSIS — O99.210 OBESITY IN PREGNANCY, ANTEPARTUM: ICD-10-CM

## 2024-03-18 DIAGNOSIS — Z34.93 PRENATAL CARE IN THIRD TRIMESTER: Primary | ICD-10-CM

## 2024-03-18 DIAGNOSIS — O98.819 GROUP B STREPTOCOCCAL INFECTION DURING PREGNANCY: ICD-10-CM

## 2024-03-18 DIAGNOSIS — Z34.90 PRENATAL CARE, ANTEPARTUM: ICD-10-CM

## 2024-03-18 LAB
GLUCOSE UR STRIP-MCNC: NEGATIVE MG/DL
PROT UR STRIP-MCNC: NEGATIVE MG/DL

## 2024-03-18 PROCEDURE — 0502F SUBSEQUENT PRENATAL CARE: CPT | Performed by: NURSE PRACTITIONER

## 2024-03-18 NOTE — PROGRESS NOTES
OB FOLLOW UP  CC- Here for care of pregnancy        Shasta Rogers is a 39 y.o.  34w6d patient being seen today for reports elevated b/p twice over the weekend (138/90 and 147/93) . She also reports intermittent contractions over the weekend that were occurring every 30 minutes apart, but have since stopped. She denies any RUQ pain, vaginal bleeding, no headache, vision changes, epigastric pain and LOF. She did feel some cramping this morning and a pinching pain on her right side. States that she has had a small amount of milky white discharge today. She has had intermittent pelvic pain. Denies urinary symptoms.    Her prenatal care is complicated by (and status) :    Patient Active Problem List   Diagnosis    AMA (advanced maternal age) multigravida 35+    Obesity in pregnancy, antepartum    Prenatal care, antepartum    Group B streptococcal infection during pregnancy       Ultrasound Today: No.    ROS -   Patient Reports : see above  Patient Denies: Loss of Fluid, Vaginal Spotting, Vision Changes, Headaches, Nausea , Vomiting , Epigastric pain, and skin itching  Fetal Movement : normal  All other systems reviewed and are negative.     The additional following portions of the patient's history were reviewed and updated as appropriate: allergies, current medications, past family history, past medical history, past social history, past surgical history, and problem list.    I have reviewed and agree with the HPI, ROS, and historical information as entered above. Kelle Brasher, APRN      /90   Wt 94.2 kg (207 lb 9.6 oz)   LMP 2023   BMI 34.02 kg/m²       EXAM:     Prenatal Vitals  BP: 138/90  Weight: 94.2 kg (207 lb 9.6 oz)   Fetal Heart Rate: pos            Assessment and Plan    Problem List Items Addressed This Visit       AMA (advanced maternal age) multigravida 35+    Overview     cfDNA negative.  Boy!  On daily baby aspirin. [facility] anatomy         Obesity in pregnancy,  "antepartum    Overview     Hgb A1C at NOB 5.3  Early 1 hr gtt 101  Recommended limiting weight gain to 15-20lbs  Growth US at 32 at [facility] 4 pounds 8 ounces which is 63rd percentile  Baby asa 12-36wk  Repeat growth here at 37 weeks.  Order in         Prenatal care, antepartum    Overview      at 37w6d ROM with JTA-6 lbs 13.7 oz- no complications \" Meenu\"         Group B streptococcal infection during pregnancy    Overview     In urine at NOB no allergy to pcn          Other Visit Diagnoses       Prenatal care in third trimester    -  Primary    Relevant Orders    POC Urinalysis Dipstick (Completed)    Elevated BP without diagnosis of hypertension        Relevant Orders    AlP+ALT+AST+Creat+LD+TBili+..            Pregnancy at 34w6d  Fetal status reassuring.   BP mildly elevated without s/s preeclampsia. Labs today and FU in 3-4 days for BP re-check.  PEP labs done  RTC for worsening symptoms  Return in about 3 days (around 3/21/2024) for CORINNE BP check.    Kelle Brasher, APRN  2024    "

## 2024-03-18 NOTE — TELEPHONE ENCOUNTER
34w6d ob patient is calling because she isnt sure if she is having madai amezcua or contractions since last night

## 2024-03-18 NOTE — TELEPHONE ENCOUNTER
OB 34w6d  Dr. Prajapati patient  MBT A Positive    Spoke to patient she reports elevated b/p two ranges over the weekend (138/90 and 147/93) . She also reports intermittent contractions over the weekend that were occurring every 30 minuyes apart, but have since subsided. She denies any RUQ pain, vaginal bleeding, and LOF. Scheduled patient an appt today for b/p check and possible PEP labs. Patient mirta.

## 2024-03-21 ENCOUNTER — ROUTINE PRENATAL (OUTPATIENT)
Dept: OBSTETRICS AND GYNECOLOGY | Facility: CLINIC | Age: 39
End: 2024-03-21
Payer: COMMERCIAL

## 2024-03-21 VITALS — WEIGHT: 206 LBS | BODY MASS INDEX: 33.76 KG/M2 | DIASTOLIC BLOOD PRESSURE: 84 MMHG | SYSTOLIC BLOOD PRESSURE: 128 MMHG

## 2024-03-21 DIAGNOSIS — Z34.93 PRENATAL CARE IN THIRD TRIMESTER: Primary | ICD-10-CM

## 2024-03-21 DIAGNOSIS — Z34.90 PRENATAL CARE, ANTEPARTUM: ICD-10-CM

## 2024-03-21 DIAGNOSIS — O98.819 GROUP B STREPTOCOCCAL INFECTION DURING PREGNANCY: ICD-10-CM

## 2024-03-21 DIAGNOSIS — B95.1 GROUP B STREPTOCOCCAL INFECTION DURING PREGNANCY: ICD-10-CM

## 2024-03-21 DIAGNOSIS — O99.210 OBESITY IN PREGNANCY, ANTEPARTUM: ICD-10-CM

## 2024-03-21 DIAGNOSIS — O09.529 ANTEPARTUM MULTIGRAVIDA OF ADVANCED MATERNAL AGE: ICD-10-CM

## 2024-03-21 LAB
GLUCOSE UR STRIP-MCNC: NEGATIVE MG/DL
PROT UR STRIP-MCNC: NEGATIVE MG/DL

## 2024-03-21 NOTE — PROGRESS NOTES
"      OB FOLLOW UP  CC- Here for care of pregnancy        Shasta Rogers is a 39 y.o.  35w2d patient being seen today for her obstetrical follow up visit. Patient reports no complaints.     Her prenatal care is complicated by (and status) :   Patient Active Problem List   Diagnosis    AMA (advanced maternal age) multigravida 35+    Obesity in pregnancy, antepartum    Prenatal care, antepartum    Group B streptococcal infection during pregnancy       Flu Status: Declines  Ultrasound Today: No  Non Stress Test: No.    ROS -   Patient Denies: Loss of Fluid, Vaginal Spotting, Vision Changes, Headaches, Nausea , Vomiting , Contractions, Epigastric pain, and skin itching  Fetal Movement : normal  All other systems reviewed and are negative.       The additional following portions of the patient's history were reviewed and updated as appropriate: allergies, current medications, past family history, past medical history, past social history, past surgical history, and problem list.    I have reviewed and agree with the HPI, ROS, and historical information as entered above. Kelle Brasher, APRN      /84   Wt 93.4 kg (206 lb)   LMP 2023   BMI 33.76 kg/m²       EXAM:     Prenatal Vitals  BP: 128/84  Weight: 93.4 kg (206 lb)   Fetal Heart Rate: pos               Urine Glucose Read-only: Negative  Urine Protein Read-only: Negative           Assessment and Plan    Problem List Items Addressed This Visit       AMA (advanced maternal age) multigravida 35+    Overview     cfDNA negative.  Boy!  On daily baby aspirin. [facility] anatomy         Obesity in pregnancy, antepartum    Overview     Hgb A1C at NOB 5.3  Early 1 hr gtt 101  Recommended limiting weight gain to 15-20lbs  Growth US at 32 at [facility] 4 pounds 8 ounces which is 63rd percentile  Baby asa 12-36wk  Repeat growth here at 37 weeks.  Order in         Prenatal care, antepartum    Overview      at 37w6d ROM with JTA-6 lbs 13.7 oz- no complications \" " "eMenu\"         Group B streptococcal infection during pregnancy    Overview     In urine at NOB no allergy to pcn          Other Visit Diagnoses       Prenatal care in third trimester    -  Primary    Relevant Orders    POC Urinalysis Dipstick (Completed)            Pregnancy at 35w2d. Shasta feels much better than earlier this week.  Continue to monitor BP and call for >/140/90, s/s preeclampsia  Fetal status reassuring.   Activity and Exercise discussed.  Fetal movement/PTL or Labor precautions  Reviewed Pre-eclampsia signs/symptoms  GBS next visit  Return in about 1 week (around 3/28/2024) for Ultrasound, CAROLINA Brasher, APRN  03/21/2024    "

## 2024-03-28 ENCOUNTER — ROUTINE PRENATAL (OUTPATIENT)
Dept: OBSTETRICS AND GYNECOLOGY | Facility: CLINIC | Age: 39
End: 2024-03-28
Payer: COMMERCIAL

## 2024-03-28 VITALS — DIASTOLIC BLOOD PRESSURE: 86 MMHG | BODY MASS INDEX: 33.76 KG/M2 | WEIGHT: 206 LBS | SYSTOLIC BLOOD PRESSURE: 136 MMHG

## 2024-03-28 DIAGNOSIS — O98.819 GROUP B STREPTOCOCCAL INFECTION DURING PREGNANCY: ICD-10-CM

## 2024-03-28 DIAGNOSIS — O09.529 ANTEPARTUM MULTIGRAVIDA OF ADVANCED MATERNAL AGE: ICD-10-CM

## 2024-03-28 DIAGNOSIS — Z34.90 PRENATAL CARE, ANTEPARTUM: Primary | ICD-10-CM

## 2024-03-28 DIAGNOSIS — O99.210 OBESITY IN PREGNANCY, ANTEPARTUM: ICD-10-CM

## 2024-03-28 DIAGNOSIS — B95.1 GROUP B STREPTOCOCCAL INFECTION DURING PREGNANCY: ICD-10-CM

## 2024-03-28 LAB
GLUCOSE UR STRIP-MCNC: NEGATIVE MG/DL
PROT UR STRIP-MCNC: NEGATIVE MG/DL

## 2024-03-28 PROCEDURE — 0502F SUBSEQUENT PRENATAL CARE: CPT | Performed by: OBSTETRICS & GYNECOLOGY

## 2024-03-28 NOTE — PROGRESS NOTES
OB FOLLOW UP  CC- Here for care of pregnancy        Shasta Rogers is a 39 y.o.  36w2d patient being seen today for her obstetrical follow up visit. Patient reports BH ctx and occasional seeing spots but states she has been doing this for a while now. They're not associated with a HA.    Her prenatal care is complicated by (and status) :  Patient Active Problem List   Diagnosis    AMA (advanced maternal age) multigravida 35+    Obesity in pregnancy, antepartum    Prenatal care, antepartum    Group B streptococcal infection during pregnancy       GBS Status: was positive in urine.    Allergies   Allergen Reactions    Latex Hives            Her Delivery Plan is: [facility] IOL at 39wks. Scheduled   @ [place]  US done today: Yes.  Findings showed Male fetus in cephalic presentation with fetal heart rate of 130.  Posterior placenta and three-vessel cord noted.  Normal fluid volume.  BPP 8 out of 8.  Estimated fetal weight 6 pounds 4 ounces which is 44th percentile..  I have personally evaluated the U/S and agree with the findings. Debora Prajapati MD    Non Stress Test: No.    ROS -   Patient Denies: Loss of Fluid, Vaginal Spotting, and Headaches  Fetal Movement : normal  All other systems reviewed and are negative.       The additional following portions of the patient's history were reviewed and updated as appropriate: allergies, current medications, past family history, past medical history, past social history, past surgical history, and problem list.    I have reviewed and agree with the HPI, ROS, and historical information as entered above. Debora Prajapati MD        EXAM:     Prenatal Vitals  BP: 136/86  Weight: 93.4 kg (206 lb)   Fetal Heart Rate: 130bpm              Urine Glucose Read-only: Negative  Urine Protein Read-only: Negative           Assessment and Plan    Problem List Items Addressed This Visit          Gravid and     AMA (advanced maternal age) multigravida 35+    Overview  "    cfDNA negative.  Boy!  On daily baby aspirin. PD[facility] anatomy         Obesity in pregnancy, antepartum    Overview     Hgb A1C at NOB 5.3  Early 1 hr gtt 101  Recommended limiting weight gain to 15-20lbs  Growth US at 32 at [facility] 4 pounds 8 ounces which is 63rd percentile  Baby asa 12-36wk  Repeat growth here at 37 weeks.  Order in         Prenatal care, antepartum - Primary    Overview      at 37w6d ROM with JTA-6 lbs 13.7 oz- no complications \" Meenu\"    IOL  @ [place]         Relevant Orders    POC Urinalysis Dipstick (Completed)    Group B streptococcal infection during pregnancy    Overview     In urine at NOB no allergy to pcn            Pregnancy at 36w2d  Fetal status reassuring.   Reviewed Pre-eclampsia signs/symptoms  Delivery options reviewed with patient  Signs of labor reviewed  Kick counts reviewed  Activity and Exercise discussed.  Return in about 1 week (around 2024).    Debora Prajapati MD  2024    "

## 2024-04-02 LAB
ALP SERPL-CCNC: 114 U/L (ref 39–117)
ALT SERPL-CCNC: 9 U/L (ref 1–33)
AST SERPL-CCNC: 14 U/L (ref 1–32)
BASOPHILS # BLD AUTO: 0.03 10E3/MM3 (ref 0–0.2)
BASOPHILS NFR BLD AUTO: 0.3 % (ref 0–1.5)
BILIRUB SERPL-MCNC: 0.2 MG/DL (ref 0–1.2)
CREAT SERPL-MCNC: 0.57 MG/DL (ref 0.57–1)
EGFRCR SERPLBLD CKD-EPI 2021: 118.7 ML/MIN/1.73
EOSINOPHIL # BLD AUTO: 0.08 10E3/MM3 (ref 0–0.4)
EOSINOPHIL NFR BLD AUTO: 0.8 % (ref 0.3–6.2)
ERYTHROCYTE [DISTWIDTH] IN BLOOD BY AUTOMATED COUNT: 13.1 % (ref 12.3–15.4)
HCT VFR BLD AUTO: 37.5 % (ref 34–46.6)
HGB BLD-MCNC: 12.6 G/DL (ref 12–15.9)
IMM GRANULOCYTES # BLD AUTO: 0.09 10E3/MM3 (ref 0–0.05)
IMM GRANULOCYTES NFR BLD AUTO: 0.9 % (ref 0–0.5)
LDH SERPL L TO P-CCNC: 166 U/L (ref 135–214)
LYMPHOCYTES # BLD AUTO: 2.43 10E3/MM3 (ref 0.7–3.1)
LYMPHOCYTES NFR BLD AUTO: 23.9 % (ref 19.6–45.3)
MCH RBC QN AUTO: 30.1 PG (ref 26.6–33)
MCHC RBC AUTO-ENTMCNC: 33.6 G/DL (ref 31.5–35.7)
MCV RBC AUTO: 89.7 FL (ref 79–97)
MONOCYTES # BLD AUTO: 1.14 10E3/MM3 (ref 0.1–0.9)
MONOCYTES NFR BLD AUTO: 11.2 % (ref 5–12)
NEUTROPHILS # BLD AUTO: 6.38 10E3/MM3 (ref 1.7–7)
NEUTROPHILS NFR BLD AUTO: 62.9 % (ref 42.7–76)
NRBC BLD AUTO-RTO: 0 /100 WBC (ref 0–0.2)
PLATELET # BLD AUTO: 313 10E3/MM3 (ref 140–450)
RBC # BLD AUTO: 4.18 10E6/MM3 (ref 3.77–5.28)
URATE SERPL-MCNC: 3.7 MG/DL (ref 2.4–5.7)
WBC # BLD AUTO: 10.15 10E3/MM3 (ref 3.4–10.8)

## 2024-04-04 ENCOUNTER — ROUTINE PRENATAL (OUTPATIENT)
Dept: OBSTETRICS AND GYNECOLOGY | Facility: CLINIC | Age: 39
End: 2024-04-04
Payer: COMMERCIAL

## 2024-04-04 VITALS — BODY MASS INDEX: 34.58 KG/M2 | WEIGHT: 211 LBS | DIASTOLIC BLOOD PRESSURE: 98 MMHG | SYSTOLIC BLOOD PRESSURE: 142 MMHG

## 2024-04-04 DIAGNOSIS — Z34.90 PRENATAL CARE, ANTEPARTUM: Primary | ICD-10-CM

## 2024-04-04 DIAGNOSIS — O09.529 ANTEPARTUM MULTIGRAVIDA OF ADVANCED MATERNAL AGE: ICD-10-CM

## 2024-04-04 DIAGNOSIS — B95.1 GROUP B STREPTOCOCCAL INFECTION DURING PREGNANCY: ICD-10-CM

## 2024-04-04 DIAGNOSIS — O98.819 GROUP B STREPTOCOCCAL INFECTION DURING PREGNANCY: ICD-10-CM

## 2024-04-04 LAB
GLUCOSE UR STRIP-MCNC: NEGATIVE MG/DL
PROT UR STRIP-MCNC: ABNORMAL MG/DL

## 2024-04-04 NOTE — PROGRESS NOTES
OB FOLLOW UP  CC- Here for care of pregnancy        Shasta Rogers is a 39 y.o.  37w2d patient being seen today for her obstetrical follow up visit. Patient reports floaters in vision that have been there since before last visit. At home BP's range from 138-140 and 84-88. She reports mild swelling in hands and feet.     Her prenatal care is complicated by (and status) :   Patient Active Problem List   Diagnosis    AMA (advanced maternal age) multigravida 35+    Obesity in pregnancy, antepartum    Prenatal care, antepartum    Group B streptococcal infection during pregnancy       GBS Status: POS in urine  Group B Strep Culture   Date Value Ref Range Status   2019 Streptococcus agalactiae (Group B) (A)  Final     Comment:     This organism is considered to be universally susceptible to penicillin.  No further antibiotic testing will be performed.  If Clindamycin or Erythromycin is the drug of choice, notify the laboratory within 7 days to request susceptibility testing.         Allergies   Allergen Reactions    Latex Hives          Flu Status: Declines  Her Delivery Plan is: [facility] IOL at 39wks. Scheduled   @ 12AM  US today: no    Non Stress Test: YES-  Reason for test:  AMA  Date of Test: 2024  Time frame of test: 20 min  RN NST Interpretation:   Chase OTERO    ROS -   Patient Denies: Loss of Fluid, Vaginal Spotting, Vision Changes, Headaches, Nausea , Vomiting , Contractions, Epigastric pain, and skin itching  Fetal Movement : normal  All other systems reviewed and are negative.       The additional following portions of the patient's history were reviewed and updated as appropriate: allergies, current medications, past family history, past medical history, past social history, past surgical history, and problem list.    I have reviewed and agree with the HPI, ROS, and historical information as entered above. Sydnee Amaya, APRN        EXAM:     Prenatal Vitals  BP: 142/98  "(138/80)  Weight: 95.7 kg (211 lb)   Fetal Heart Rate: NST              Urine Glucose Read-only: Negative  Urine Protein Read-only: (!) Trace           Assessment and Plan    Problem List Items Addressed This Visit          Gravid and     AMA (advanced maternal age) multigravida 35+    Overview     cfDNA negative.  Boy!  On daily baby aspirin. [facility] anatomy         Prenatal care, antepartum - Primary    Overview      at 37w6d ROM with JTA-6 lbs 13.7 oz- no complications \" Meenu\"    IOL  @ [place]         Relevant Orders    POC Urinalysis Dipstick (Completed)    Preeclampsia Panel    Group B streptococcal infection during pregnancy    Overview     In urine at NOB no allergy to pcn            Pregnancy at 37w2d  Fetal status reassuring. 20 min NST reactive 15 x 15  Reviewed Pre-eclampsia signs/symptoms  Instructed to stop baby asa  GBS pos - Discussed need for prophylaxis during labor.  She is not allergic to PCN  Delivery options reviewed with patient  Signs of labor reviewed  Kick counts reviewed  Activity and Exercise discussed.  PEP drawn today r/t \"floaters\" and mild swelling. She had one elevated blood pressure today 142/98, however the other two were normal 130/80 and 128/84  Follow up Monday with NST and bp check.    Sydnee Amaya, APRN  2024   "

## 2024-04-05 LAB
ALT SERPL-CCNC: 11 IU/L (ref 0–32)
AST SERPL-CCNC: 13 IU/L (ref 0–40)
BASOPHILS # BLD AUTO: 0 X10E3/UL (ref 0–0.2)
BASOPHILS NFR BLD AUTO: 0 %
BUN SERPL-MCNC: 7 MG/DL (ref 6–20)
CREAT SERPL-MCNC: 0.49 MG/DL (ref 0.57–1)
CREAT UR-MCNC: ABNORMAL MG/DL
EGFRCR SERPLBLD CKD-EPI 2021: 123 ML/MIN/1.73
EOSINOPHIL # BLD AUTO: 0.1 X10E3/UL (ref 0–0.4)
EOSINOPHIL NFR BLD AUTO: 1 %
ERYTHROCYTE [DISTWIDTH] IN BLOOD BY AUTOMATED COUNT: 13.1 % (ref 11.7–15.4)
HCT VFR BLD AUTO: 37.7 % (ref 34–46.6)
HGB BLD-MCNC: 12.6 G/DL (ref 11.1–15.9)
IMM GRANULOCYTES # BLD AUTO: 0.1 X10E3/UL (ref 0–0.1)
IMM GRANULOCYTES NFR BLD AUTO: 1 %
LDH SERPL L TO P-CCNC: 139 IU/L (ref 119–226)
LYMPHOCYTES # BLD AUTO: 2.2 X10E3/UL (ref 0.7–3.1)
LYMPHOCYTES NFR BLD AUTO: 22 %
MCH RBC QN AUTO: 29.6 PG (ref 26.6–33)
MCHC RBC AUTO-ENTMCNC: 33.4 G/DL (ref 31.5–35.7)
MCV RBC AUTO: 89 FL (ref 79–97)
MICROALBUMIN UR-MCNC: ABNORMAL
MONOCYTES # BLD AUTO: 1.2 X10E3/UL (ref 0.1–0.9)
MONOCYTES NFR BLD AUTO: 12 %
NEUTROPHILS # BLD AUTO: 6.6 X10E3/UL (ref 1.4–7)
NEUTROPHILS NFR BLD AUTO: 64 %
PLATELET # BLD AUTO: 311 X10E3/UL (ref 150–450)
RBC # BLD AUTO: 4.25 X10E6/UL (ref 3.77–5.28)
URATE SERPL-MCNC: 3.4 MG/DL (ref 2.6–6.2)
WBC # BLD AUTO: 10.2 X10E3/UL (ref 3.4–10.8)

## 2024-04-08 ENCOUNTER — ROUTINE PRENATAL (OUTPATIENT)
Dept: OBSTETRICS AND GYNECOLOGY | Facility: CLINIC | Age: 39
End: 2024-04-08
Payer: COMMERCIAL

## 2024-04-08 VITALS — DIASTOLIC BLOOD PRESSURE: 70 MMHG | WEIGHT: 210 LBS | BODY MASS INDEX: 34.41 KG/M2 | SYSTOLIC BLOOD PRESSURE: 128 MMHG

## 2024-04-08 DIAGNOSIS — Z34.93 THIRD TRIMESTER PREGNANCY: Primary | ICD-10-CM

## 2024-04-08 DIAGNOSIS — O09.529 ANTEPARTUM MULTIGRAVIDA OF ADVANCED MATERNAL AGE: ICD-10-CM

## 2024-04-08 DIAGNOSIS — O99.210 OBESITY IN PREGNANCY, ANTEPARTUM: ICD-10-CM

## 2024-04-08 DIAGNOSIS — O98.819 GROUP B STREPTOCOCCAL INFECTION DURING PREGNANCY: ICD-10-CM

## 2024-04-08 DIAGNOSIS — B95.1 GROUP B STREPTOCOCCAL INFECTION DURING PREGNANCY: ICD-10-CM

## 2024-04-08 LAB
GLUCOSE UR STRIP-MCNC: NEGATIVE MG/DL
PROT UR STRIP-MCNC: NEGATIVE MG/DL

## 2024-04-08 PROCEDURE — 0502F SUBSEQUENT PRENATAL CARE: CPT

## 2024-04-08 NOTE — PROGRESS NOTES
OB FOLLOW UP  CC- Here for care of pregnancy        Shasta Rogers is a 39 y.o.  37w6d patient being seen today for her obstetrical follow up visit. Patient reports no complaints. +GBS in NOB urine/will tx in labor. Pt feels well, good fm, no ctx    Her prenatal care is complicated by (and status) :   Patient Active Problem List   Diagnosis    AMA (advanced maternal age) multigravida 35+    Obesity in pregnancy, antepartum    Prenatal care, antepartum    Group B streptococcal infection during pregnancy       GBS Status:   Group B Strep Culture   Date Value Ref Range Status   2019 Streptococcus agalactiae (Group B) (A)  Final     Comment:     This organism is considered to be universally susceptible to penicillin.  No further antibiotic testing will be performed.  If Clindamycin or Erythromycin is the drug of choice, notify the laboratory within 7 days to request susceptibility testing.         Allergies   Allergen Reactions    Latex Hives          Flu Status: Declines  Her Delivery Plan is:  vaginal     US today: yes BPP   Non Stress Test: No.    ROS -   Patient Denies: Loss of Fluid, Vaginal Spotting, Vision Changes, Headaches, Nausea , Vomiting , Contractions, Epigastric pain, and skin itching  Fetal Movement : normal  All other systems reviewed and are negative.       The additional following portions of the patient's history were reviewed and updated as appropriate: allergies, current medications, past family history, past medical history, past social history, past surgical history, and problem list.    I have reviewed and agree with the HPI, ROS, and historical information as entered above. Sydnee Amaya, APRN        EXAM:     Prenatal Vitals  BP: 128/70  Weight: 95.3 kg (210 lb)   Fetal Heart Rate: US              Urine Glucose Read-only: Negative  Urine Protein Read-only: Negative           Assessment and Plan    Problem List Items Addressed This Visit          Gravid and     AMA  (advanced maternal age) multigravida 35+    Overview     cfDNA negative.  Boy!  On daily baby aspirin. PDC anatomy         Obesity in pregnancy, antepartum    Overview     Hgb A1C at NOB 5.3  Early 1 hr gtt 101  Recommended limiting weight gain to 15-20lbs  Growth US at 32 at [facility] 4 pounds 8 ounces which is 63rd percentile  Baby asa 12-36wk  Repeat growth here at 37 weeks.  4/8/24 cephalic; ; SAI 11.7cm, BPP 8/8         Group B streptococcal infection during pregnancy    Overview     In urine at NOB no allergy to pcn          Other Visit Diagnoses       Third trimester pregnancy    -  Primary    Relevant Orders    POC Urinalysis Dipstick (Completed)    Group B Streptococcus Culture - Swab, Vaginal/Rectum            Pregnancy at 37w6d  Fetal status reassuring. BPP 8/8 today  Reviewed Pre-eclampsia signs/symptoms  Discussed IOL options with patient. Pt. desires IOL at 39 weeks.   Reviewed upcoming IOL with patient. Instructions given.  Delivery options reviewed with patient  Signs of labor reviewed  Kick counts reviewed  Activity and Exercise discussed.  Follow up on Thursday for NST for MARCELO Amaya, APRN  04/08/2024

## 2024-04-12 ENCOUNTER — ROUTINE PRENATAL (OUTPATIENT)
Dept: OBSTETRICS AND GYNECOLOGY | Facility: CLINIC | Age: 39
End: 2024-04-12
Payer: COMMERCIAL

## 2024-04-12 VITALS — WEIGHT: 211 LBS | BODY MASS INDEX: 34.58 KG/M2 | DIASTOLIC BLOOD PRESSURE: 80 MMHG | SYSTOLIC BLOOD PRESSURE: 136 MMHG

## 2024-04-12 DIAGNOSIS — O09.529 ANTEPARTUM MULTIGRAVIDA OF ADVANCED MATERNAL AGE: Primary | ICD-10-CM

## 2024-04-12 DIAGNOSIS — B95.1 GROUP B STREPTOCOCCAL INFECTION DURING PREGNANCY: ICD-10-CM

## 2024-04-12 DIAGNOSIS — O99.210 OBESITY IN PREGNANCY, ANTEPARTUM: ICD-10-CM

## 2024-04-12 DIAGNOSIS — Z34.90 PRENATAL CARE, ANTEPARTUM: ICD-10-CM

## 2024-04-12 DIAGNOSIS — O98.819 GROUP B STREPTOCOCCAL INFECTION DURING PREGNANCY: ICD-10-CM

## 2024-04-12 LAB
GLUCOSE UR STRIP-MCNC: NEGATIVE MG/DL
PROT UR STRIP-MCNC: NEGATIVE MG/DL

## 2024-04-12 NOTE — PROGRESS NOTES
OB FOLLOW UP  CC- Here for care of pregnancy        Shasta Rogers is a 39 y.o.  38w3d patient being seen today for her obstetrical follow up visit. Patient reports no complaints.     Her prenatal care is complicated by (and status) :   Patient Active Problem List   Diagnosis    AMA (advanced maternal age) multigravida 35+    Obesity in pregnancy, antepartum    Prenatal care, antepartum    Group B streptococcal infection during pregnancy       GBS Status:   Group B Strep Culture   Date Value Ref Range Status   2019 Streptococcus agalactiae (Group B) (A)  Final     Comment:     This organism is considered to be universally susceptible to penicillin.  No further antibiotic testing will be performed.  If Clindamycin or Erythromycin is the drug of choice, notify the laboratory within 7 days to request susceptibility testing.         Allergies   Allergen Reactions    Latex Hives        Her Delivery Plan is: Desires IOL at 39wks. Scheduled  @ [facility] today: no  Non Stress Test: Yes minutes 35  non-stress test: NST: Reactive  indication: Advanced Maternal Age  category: Category I    ROS -   Patient Denies: Loss of Fluid, Vaginal Spotting, Headaches, and Contractions  Fetal Movement : normal  All other systems reviewed and are negative.       The additional following portions of the patient's history were reviewed and updated as appropriate: allergies, current medications, past family history, past medical history, past social history, past surgical history, and problem list.    I have reviewed and agree with the HPI, ROS, and historical information as entered above. Debora Prajapati MD        EXAM:     Prenatal Vitals  BP: 136/80  Weight: 95.7 kg (211 lb)   Fetal Heart Rate: 130bpm              Urine Glucose Read-only: Negative  Urine Protein Read-only: Negative           Assessment and Plan    Problem List Items Addressed This Visit          Gravid and     AMA (advanced maternal age)  "multigravida 35+ - Primary    Overview     cfDNA negative.  Boy!  On daily baby aspirin. PDC anatomy         Obesity in pregnancy, antepartum    Overview     Hgb A1C at NOB 5.3  Early 1 hr gtt 101  Recommended limiting weight gain to 15-20lbs  Growth US at 32 at PDC 4 pounds 8 ounces which is 63rd percentile  Baby asa 12-36wk  Repeat growth here at 37 weeks.  24 cephalic; ; SAI 11.7cm, BPP          Prenatal care, antepartum    Overview      at 37w6d ROM with JTA-6 lbs 13.7 oz- no complications \" Meenu\"    IOL  @ [facility]         Relevant Orders    POC Urinalysis Dipstick (Completed)    Group B streptococcal infection during pregnancy    Overview     In urine at NOB no allergy to pcn            Pregnancy at 38w3d  Fetal status reassuring.   Reviewed Pre-eclampsia signs/symptoms  Delivery options reviewed with patient  Signs of labor reviewed  Kick counts reviewed  Activity and Exercise discussed.  Return in about 4 days (around 2024) for nst.    Debora Prajapati MD  2024    "

## 2024-04-16 ENCOUNTER — PREP FOR SURGERY (OUTPATIENT)
Dept: OTHER | Facility: HOSPITAL | Age: 39
End: 2024-04-16
Payer: COMMERCIAL

## 2024-04-16 ENCOUNTER — ROUTINE PRENATAL (OUTPATIENT)
Dept: OBSTETRICS AND GYNECOLOGY | Facility: CLINIC | Age: 39
End: 2024-04-16
Payer: COMMERCIAL

## 2024-04-16 VITALS — BODY MASS INDEX: 34.41 KG/M2 | DIASTOLIC BLOOD PRESSURE: 84 MMHG | SYSTOLIC BLOOD PRESSURE: 130 MMHG | WEIGHT: 210 LBS

## 2024-04-16 DIAGNOSIS — O99.210 OBESITY IN PREGNANCY, ANTEPARTUM: ICD-10-CM

## 2024-04-16 DIAGNOSIS — B95.1 GROUP B STREPTOCOCCAL INFECTION DURING PREGNANCY: ICD-10-CM

## 2024-04-16 DIAGNOSIS — O09.523 MULTIGRAVIDA OF ADVANCED MATERNAL AGE IN THIRD TRIMESTER: ICD-10-CM

## 2024-04-16 DIAGNOSIS — O99.210 OBESITY IN PREGNANCY, ANTEPARTUM: Primary | ICD-10-CM

## 2024-04-16 DIAGNOSIS — Z34.90 PRENATAL CARE, ANTEPARTUM: Primary | ICD-10-CM

## 2024-04-16 DIAGNOSIS — O98.819 GROUP B STREPTOCOCCAL INFECTION DURING PREGNANCY: ICD-10-CM

## 2024-04-16 LAB
GLUCOSE UR STRIP-MCNC: NEGATIVE MG/DL
PROT UR STRIP-MCNC: NEGATIVE MG/DL

## 2024-04-16 PROCEDURE — 0502F SUBSEQUENT PRENATAL CARE: CPT | Performed by: OBSTETRICS & GYNECOLOGY

## 2024-04-16 PROCEDURE — 59025 FETAL NON-STRESS TEST: CPT | Performed by: OBSTETRICS & GYNECOLOGY

## 2024-04-16 RX ORDER — PENICILLIN G 3000000 [IU]/50ML
3 INJECTION, SOLUTION INTRAVENOUS EVERY 4 HOURS
Status: CANCELLED | OUTPATIENT
Start: 2024-04-16 | End: 2024-04-18

## 2024-04-16 RX ORDER — ACETAMINOPHEN 325 MG/1
650 TABLET ORAL EVERY 4 HOURS PRN
Status: CANCELLED | OUTPATIENT
Start: 2024-04-16

## 2024-04-16 RX ORDER — CARBOPROST TROMETHAMINE 250 UG/ML
250 INJECTION, SOLUTION INTRAMUSCULAR AS NEEDED
Status: CANCELLED | OUTPATIENT
Start: 2024-04-16

## 2024-04-16 RX ORDER — SODIUM CHLORIDE 9 MG/ML
40 INJECTION, SOLUTION INTRAVENOUS AS NEEDED
Status: CANCELLED | OUTPATIENT
Start: 2024-04-16

## 2024-04-16 RX ORDER — HYDROCODONE BITARTRATE AND ACETAMINOPHEN 5; 325 MG/1; MG/1
1 TABLET ORAL EVERY 4 HOURS PRN
Status: CANCELLED | OUTPATIENT
Start: 2024-04-16 | End: 2024-04-23

## 2024-04-16 RX ORDER — MISOPROSTOL 200 UG/1
800 TABLET ORAL AS NEEDED
Status: CANCELLED | OUTPATIENT
Start: 2024-04-16

## 2024-04-16 RX ORDER — MAGNESIUM CARB/ALUMINUM HYDROX 105-160MG
30 TABLET,CHEWABLE ORAL ONCE
Status: CANCELLED | OUTPATIENT
Start: 2024-04-16 | End: 2024-04-16

## 2024-04-16 RX ORDER — MORPHINE SULFATE 1 MG/ML
1 INJECTION, SOLUTION EPIDURAL; INTRATHECAL; INTRAVENOUS EVERY 4 HOURS PRN
Status: CANCELLED | OUTPATIENT
Start: 2024-04-16 | End: 2024-04-21

## 2024-04-16 RX ORDER — IBUPROFEN 600 MG/1
600 TABLET ORAL EVERY 6 HOURS PRN
Status: CANCELLED | OUTPATIENT
Start: 2024-04-16

## 2024-04-16 RX ORDER — HYDROCODONE BITARTRATE AND ACETAMINOPHEN 10; 325 MG/1; MG/1
1 TABLET ORAL EVERY 4 HOURS PRN
Status: CANCELLED | OUTPATIENT
Start: 2024-04-16 | End: 2024-04-23

## 2024-04-16 RX ORDER — SODIUM CHLORIDE 0.9 % (FLUSH) 0.9 %
10 SYRINGE (ML) INJECTION EVERY 12 HOURS SCHEDULED
Status: CANCELLED | OUTPATIENT
Start: 2024-04-16

## 2024-04-16 RX ORDER — NALOXONE HCL 0.4 MG/ML
0.4 VIAL (ML) INJECTION
Status: CANCELLED | OUTPATIENT
Start: 2024-04-16

## 2024-04-16 RX ORDER — SODIUM CHLORIDE, SODIUM LACTATE, POTASSIUM CHLORIDE, CALCIUM CHLORIDE 600; 310; 30; 20 MG/100ML; MG/100ML; MG/100ML; MG/100ML
125 INJECTION, SOLUTION INTRAVENOUS CONTINUOUS
Status: CANCELLED | OUTPATIENT
Start: 2024-04-16

## 2024-04-16 RX ORDER — OXYTOCIN/0.9 % SODIUM CHLORIDE 30/500 ML
999 PLASTIC BAG, INJECTION (ML) INTRAVENOUS ONCE
Status: CANCELLED | OUTPATIENT
Start: 2024-04-16 | End: 2024-04-16

## 2024-04-16 RX ORDER — SODIUM CHLORIDE 0.9 % (FLUSH) 0.9 %
10 SYRINGE (ML) INJECTION AS NEEDED
Status: CANCELLED | OUTPATIENT
Start: 2024-04-16

## 2024-04-16 RX ORDER — METHYLERGONOVINE MALEATE 0.2 MG/ML
200 INJECTION INTRAVENOUS ONCE AS NEEDED
Status: CANCELLED | OUTPATIENT
Start: 2024-04-16

## 2024-04-16 RX ORDER — OXYTOCIN/0.9 % SODIUM CHLORIDE 30/500 ML
250 PLASTIC BAG, INJECTION (ML) INTRAVENOUS CONTINUOUS
Status: CANCELLED | OUTPATIENT
Start: 2024-04-16 | End: 2024-04-16

## 2024-04-16 RX ORDER — MORPHINE SULFATE 2 MG/ML
2 INJECTION, SOLUTION INTRAMUSCULAR; INTRAVENOUS EVERY 4 HOURS PRN
Status: CANCELLED | OUTPATIENT
Start: 2024-04-16 | End: 2024-04-21

## 2024-04-16 RX ORDER — LIDOCAINE HYDROCHLORIDE 10 MG/ML
0.5 INJECTION, SOLUTION EPIDURAL; INFILTRATION; INTRACAUDAL; PERINEURAL ONCE AS NEEDED
Status: CANCELLED | OUTPATIENT
Start: 2024-04-16

## 2024-04-16 NOTE — PROGRESS NOTES
OB FOLLOW UP  CC- Here for care of pregnancy        Shasta Rogers is a 39 y.o.  39w0d patient being seen today for her obstetrical follow up visit. Patient reports no complaints.     Her prenatal care is complicated by (and status) :   Patient Active Problem List   Diagnosis    AMA (advanced maternal age) multigravida 35+    Obesity in pregnancy, antepartum    Prenatal care, antepartum    Group B streptococcal infection during pregnancy       GBS Status:   Group B Strep Culture   Date Value Ref Range Status   2019 Streptococcus agalactiae (Group B) (A)  Final     Comment:     This organism is considered to be universally susceptible to penicillin.  No further antibiotic testing will be performed.  If Clindamycin or Erythromycin is the drug of choice, notify the laboratory within 7 days to request susceptibility testing.         Allergies   Allergen Reactions    Latex Hives            Her Delivery Plan is: Desires IOL at 39wks. Scheduled   @ [facility] today: no  Non Stress Test: Yes minutes 40  non-stress test: NST: Reactive  indication: Advanced Maternal Age  category: Category I    ROS -   Patient Denies: Loss of Fluid, Vaginal Spotting, Vision Changes, Headaches, and Contractions  Fetal Movement : normal  All other systems reviewed and are negative.       The additional following portions of the patient's history were reviewed and updated as appropriate: allergies, current medications, past family history, past medical history, past social history, past surgical history, and problem list.    I have reviewed and agree with the HPI, ROS, and historical information as entered above. Debora Prajapati MD        EXAM:     Prenatal Vitals  BP: 130/84  Weight: 95.3 kg (210 lb)   Fetal Heart Rate: 140       Dilation/Effacement/Station  Dilation: 2  Effacement (%): 70  Station: -2      Urine Glucose Read-only: Negative  Urine Protein Read-only: Negative           Assessment and Plan    Problem List  "Items Addressed This Visit          Gravid and     AMA (advanced maternal age) multigravida 35+    Overview     cfDNA negative.  Boy!  On daily baby aspirin. PDC anatomy         Obesity in pregnancy, antepartum    Overview     Hgb A1C at NOB 5.3  Early 1 hr gtt 101  Recommended limiting weight gain to 15-20lbs  Growth US at 32 at PDC 4 pounds 8 ounces which is 63rd percentile  Baby asa 12-36wk  Repeat growth here at 37 weeks.  24 cephalic; ; SAI 11.7cm, BPP          Prenatal care, antepartum - Primary    Overview      at 37w6d ROM with JTA-6 lbs 13.7 oz- no complications \" Meenu\"    [facility]OL  @ [facility]         Relevant Orders    POC Urinalysis Dipstick (Completed)    Group B streptococcal infection during pregnancy    Overview     In urine at NOB no allergy to pcn            Pregnancy at 39w0d  Fetal status reassuring.   Reviewed Pre-eclampsia signs/symptoms  Reviewed upcoming IOL with patient. Instructions given.  Delivery options reviewed with patient  Signs of labor reviewed  Kick counts reviewed  Activity and Exercise discussed.  Return in about 6 weeks (around 2024) for PP check.    Debora Prajapati MD  2024    "

## 2024-04-17 ENCOUNTER — HOSPITAL ENCOUNTER (INPATIENT)
Facility: HOSPITAL | Age: 39
LOS: 2 days | Discharge: HOME | End: 2024-04-19
Attending: OBSTETRICS & GYNECOLOGY | Admitting: OBSTETRICS & GYNECOLOGY
Payer: COMMERCIAL

## 2024-04-17 ENCOUNTER — ANESTHESIA EVENT (OUTPATIENT)
Dept: LABOR AND DELIVERY | Facility: HOSPITAL | Age: 39
End: 2024-04-17
Payer: COMMERCIAL

## 2024-04-17 ENCOUNTER — ANESTHESIA (OUTPATIENT)
Dept: LABOR AND DELIVERY | Facility: HOSPITAL | Age: 39
End: 2024-04-17
Payer: COMMERCIAL

## 2024-04-17 DIAGNOSIS — O99.210 OBESITY IN PREGNANCY, ANTEPARTUM: ICD-10-CM

## 2024-04-17 PROBLEM — Z34.90 PREGNANCY: Status: ACTIVE | Noted: 2024-04-17

## 2024-04-17 LAB
ABO GROUP BLD: NORMAL
ALP SERPL-CCNC: 135 U/L (ref 39–117)
ALT SERPL W P-5'-P-CCNC: 7 U/L (ref 1–33)
AMPHET+METHAMPHET UR QL: NEGATIVE
AMPHETAMINES UR QL: NEGATIVE
AST SERPL-CCNC: 23 U/L (ref 1–32)
BARBITURATES UR QL SCN: NEGATIVE
BASOPHILS # BLD AUTO: 0.04 10*3/MM3 (ref 0–0.2)
BASOPHILS NFR BLD AUTO: 0.3 % (ref 0–1.5)
BENZODIAZ UR QL SCN: NEGATIVE
BILIRUB SERPL-MCNC: 0.2 MG/DL (ref 0–1.2)
BLD GP AB SCN SERPL QL: NEGATIVE
BUPRENORPHINE SERPL-MCNC: NEGATIVE NG/ML
CANNABINOIDS SERPL QL: NEGATIVE
COCAINE UR QL: NEGATIVE
CREAT SERPL-MCNC: 0.51 MG/DL (ref 0.57–1)
DEPRECATED RDW RBC AUTO: 41 FL (ref 37–54)
EOSINOPHIL # BLD AUTO: 0.08 10*3/MM3 (ref 0–0.4)
EOSINOPHIL NFR BLD AUTO: 0.7 % (ref 0.3–6.2)
ERYTHROCYTE [DISTWIDTH] IN BLOOD BY AUTOMATED COUNT: 13 % (ref 12.3–15.4)
FENTANYL UR-MCNC: NEGATIVE NG/ML
HCT VFR BLD AUTO: 38.6 % (ref 34–46.6)
HGB BLD-MCNC: 13 G/DL (ref 12–15.9)
IMM GRANULOCYTES # BLD AUTO: 0.08 10*3/MM3 (ref 0–0.05)
IMM GRANULOCYTES NFR BLD AUTO: 0.7 % (ref 0–0.5)
LDH SERPL-CCNC: 216 U/L (ref 135–214)
LYMPHOCYTES # BLD AUTO: 1.98 10*3/MM3 (ref 0.7–3.1)
LYMPHOCYTES NFR BLD AUTO: 16.6 % (ref 19.6–45.3)
MCH RBC QN AUTO: 29.4 PG (ref 26.6–33)
MCHC RBC AUTO-ENTMCNC: 33.7 G/DL (ref 31.5–35.7)
MCV RBC AUTO: 87.3 FL (ref 79–97)
METHADONE UR QL SCN: NEGATIVE
MONOCYTES # BLD AUTO: 1.21 10*3/MM3 (ref 0.1–0.9)
MONOCYTES NFR BLD AUTO: 10.2 % (ref 5–12)
NEUTROPHILS NFR BLD AUTO: 71.5 % (ref 42.7–76)
NEUTROPHILS NFR BLD AUTO: 8.53 10*3/MM3 (ref 1.7–7)
NRBC BLD AUTO-RTO: 0 /100 WBC (ref 0–0.2)
OPIATES UR QL: NEGATIVE
OXYCODONE UR QL SCN: NEGATIVE
PCP UR QL SCN: NEGATIVE
PLATELET # BLD AUTO: 304 10*3/MM3 (ref 140–450)
PMV BLD AUTO: 11.7 FL (ref 6–12)
RBC # BLD AUTO: 4.42 10*6/MM3 (ref 3.77–5.28)
RH BLD: POSITIVE
T PALLIDUM IGG SER QL: NORMAL
T&S EXPIRATION DATE: NORMAL
TRICYCLICS UR QL SCN: NEGATIVE
URATE SERPL-MCNC: 3.7 MG/DL (ref 2.4–5.7)
WBC NRBC COR # BLD AUTO: 11.92 10*3/MM3 (ref 3.4–10.8)

## 2024-04-17 PROCEDURE — 59025 FETAL NON-STRESS TEST: CPT

## 2024-04-17 PROCEDURE — 86900 BLOOD TYPING SEROLOGIC ABO: CPT | Performed by: OBSTETRICS & GYNECOLOGY

## 2024-04-17 PROCEDURE — 25810000003 LACTATED RINGERS PER 1000 ML: Performed by: OBSTETRICS & GYNECOLOGY

## 2024-04-17 PROCEDURE — 25010000002 BUPIVACAINE (PF) 0.25 % SOLUTION: Performed by: ANESTHESIOLOGY

## 2024-04-17 PROCEDURE — 84550 ASSAY OF BLOOD/URIC ACID: CPT | Performed by: OBSTETRICS & GYNECOLOGY

## 2024-04-17 PROCEDURE — 82247 BILIRUBIN TOTAL: CPT | Performed by: OBSTETRICS & GYNECOLOGY

## 2024-04-17 PROCEDURE — 86850 RBC ANTIBODY SCREEN: CPT | Performed by: OBSTETRICS & GYNECOLOGY

## 2024-04-17 PROCEDURE — 86780 TREPONEMA PALLIDUM: CPT | Performed by: OBSTETRICS & GYNECOLOGY

## 2024-04-17 PROCEDURE — 82565 ASSAY OF CREATININE: CPT | Performed by: OBSTETRICS & GYNECOLOGY

## 2024-04-17 PROCEDURE — 84075 ASSAY ALKALINE PHOSPHATASE: CPT | Performed by: OBSTETRICS & GYNECOLOGY

## 2024-04-17 PROCEDURE — 25010000002 PENICILLIN G POTASSIUM PER 600000 UNITS: Performed by: OBSTETRICS & GYNECOLOGY

## 2024-04-17 PROCEDURE — 86901 BLOOD TYPING SEROLOGIC RH(D): CPT | Performed by: OBSTETRICS & GYNECOLOGY

## 2024-04-17 PROCEDURE — 83615 LACTATE (LD) (LDH) ENZYME: CPT | Performed by: OBSTETRICS & GYNECOLOGY

## 2024-04-17 PROCEDURE — 25010000002 FENTANYL CITRATE (PF) 50 MCG/ML SOLUTION: Performed by: ANESTHESIOLOGY

## 2024-04-17 PROCEDURE — 25010000002 ROPIVACAINE PER 1 MG: Performed by: ANESTHESIOLOGY

## 2024-04-17 PROCEDURE — 80307 DRUG TEST PRSMV CHEM ANLYZR: CPT | Performed by: OBSTETRICS & GYNECOLOGY

## 2024-04-17 PROCEDURE — C1755 CATHETER, INTRASPINAL: HCPCS

## 2024-04-17 PROCEDURE — 59400 OBSTETRICAL CARE: CPT | Performed by: OBSTETRICS & GYNECOLOGY

## 2024-04-17 PROCEDURE — C1755 CATHETER, INTRASPINAL: HCPCS | Performed by: ANESTHESIOLOGY

## 2024-04-17 PROCEDURE — 51703 INSERT BLADDER CATH COMPLEX: CPT

## 2024-04-17 PROCEDURE — 85025 COMPLETE CBC W/AUTO DIFF WBC: CPT | Performed by: OBSTETRICS & GYNECOLOGY

## 2024-04-17 PROCEDURE — 84450 TRANSFERASE (AST) (SGOT): CPT | Performed by: OBSTETRICS & GYNECOLOGY

## 2024-04-17 PROCEDURE — 84460 ALANINE AMINO (ALT) (SGPT): CPT | Performed by: OBSTETRICS & GYNECOLOGY

## 2024-04-17 PROCEDURE — 25010000002 ONDANSETRON PER 1 MG: Performed by: ANESTHESIOLOGY

## 2024-04-17 RX ORDER — MISOPROSTOL 200 UG/1
800 TABLET ORAL AS NEEDED
Status: DISCONTINUED | OUTPATIENT
Start: 2024-04-17 | End: 2024-04-17 | Stop reason: HOSPADM

## 2024-04-17 RX ORDER — IBUPROFEN 600 MG/1
600 TABLET ORAL EVERY 6 HOURS PRN
Status: DISCONTINUED | OUTPATIENT
Start: 2024-04-17 | End: 2024-04-19 | Stop reason: HOSPADM

## 2024-04-17 RX ORDER — METHYLERGONOVINE MALEATE 0.2 MG/ML
200 INJECTION INTRAVENOUS ONCE AS NEEDED
Status: DISCONTINUED | OUTPATIENT
Start: 2024-04-17 | End: 2024-04-17 | Stop reason: HOSPADM

## 2024-04-17 RX ORDER — NALOXONE HCL 0.4 MG/ML
0.4 VIAL (ML) INJECTION
Status: DISCONTINUED | OUTPATIENT
Start: 2024-04-17 | End: 2024-04-17 | Stop reason: HOSPADM

## 2024-04-17 RX ORDER — ACETAMINOPHEN 325 MG/1
650 TABLET ORAL EVERY 4 HOURS PRN
Status: DISCONTINUED | OUTPATIENT
Start: 2024-04-17 | End: 2024-04-17 | Stop reason: HOSPADM

## 2024-04-17 RX ORDER — HYDROCODONE BITARTRATE AND ACETAMINOPHEN 10; 325 MG/1; MG/1
1 TABLET ORAL EVERY 4 HOURS PRN
Status: DISCONTINUED | OUTPATIENT
Start: 2024-04-17 | End: 2024-04-17 | Stop reason: HOSPADM

## 2024-04-17 RX ORDER — HYDROCORTISONE 25 MG/G
1 CREAM TOPICAL AS NEEDED
Status: DISCONTINUED | OUTPATIENT
Start: 2024-04-17 | End: 2024-04-19 | Stop reason: HOSPADM

## 2024-04-17 RX ORDER — ROPIVACAINE HYDROCHLORIDE 2 MG/ML
15 INJECTION, SOLUTION EPIDURAL; INFILTRATION; PERINEURAL CONTINUOUS
Status: DISCONTINUED | OUTPATIENT
Start: 2024-04-17 | End: 2024-04-17

## 2024-04-17 RX ORDER — DIPHENHYDRAMINE HYDROCHLORIDE 50 MG/ML
12.5 INJECTION INTRAMUSCULAR; INTRAVENOUS EVERY 8 HOURS PRN
Status: DISCONTINUED | OUTPATIENT
Start: 2024-04-17 | End: 2024-04-17 | Stop reason: HOSPADM

## 2024-04-17 RX ORDER — MISOPROSTOL 200 UG/1
800 TABLET ORAL AS NEEDED
Status: DISCONTINUED | OUTPATIENT
Start: 2024-04-17 | End: 2024-04-19 | Stop reason: HOSPADM

## 2024-04-17 RX ORDER — SODIUM CHLORIDE 0.9 % (FLUSH) 0.9 %
10 SYRINGE (ML) INJECTION EVERY 12 HOURS SCHEDULED
Status: DISCONTINUED | OUTPATIENT
Start: 2024-04-17 | End: 2024-04-17 | Stop reason: HOSPADM

## 2024-04-17 RX ORDER — DOCUSATE SODIUM 100 MG/1
100 CAPSULE, LIQUID FILLED ORAL 2 TIMES DAILY
Status: DISCONTINUED | OUTPATIENT
Start: 2024-04-17 | End: 2024-04-19 | Stop reason: HOSPADM

## 2024-04-17 RX ORDER — ONDANSETRON 4 MG/1
4 TABLET, ORALLY DISINTEGRATING ORAL EVERY 6 HOURS PRN
Status: DISCONTINUED | OUTPATIENT
Start: 2024-04-17 | End: 2024-04-19 | Stop reason: HOSPADM

## 2024-04-17 RX ORDER — METOCLOPRAMIDE HYDROCHLORIDE 5 MG/ML
10 INJECTION INTRAMUSCULAR; INTRAVENOUS ONCE AS NEEDED
Status: DISCONTINUED | OUTPATIENT
Start: 2024-04-17 | End: 2024-04-17 | Stop reason: HOSPADM

## 2024-04-17 RX ORDER — FENTANYL CITRATE 50 UG/ML
INJECTION, SOLUTION INTRAMUSCULAR; INTRAVENOUS AS NEEDED
Status: DISCONTINUED | OUTPATIENT
Start: 2024-04-17 | End: 2024-04-17 | Stop reason: SURG

## 2024-04-17 RX ORDER — PENICILLIN G 3000000 [IU]/50ML
3 INJECTION, SOLUTION INTRAVENOUS EVERY 4 HOURS
Qty: 600 ML | Refills: 0 | Status: DISCONTINUED | OUTPATIENT
Start: 2024-04-17 | End: 2024-04-17 | Stop reason: HOSPADM

## 2024-04-17 RX ORDER — HYDROCODONE BITARTRATE AND ACETAMINOPHEN 10; 325 MG/1; MG/1
1 TABLET ORAL EVERY 4 HOURS PRN
Status: DISCONTINUED | OUTPATIENT
Start: 2024-04-17 | End: 2024-04-19 | Stop reason: HOSPADM

## 2024-04-17 RX ORDER — PROMETHAZINE HYDROCHLORIDE 12.5 MG/1
12.5 TABLET ORAL EVERY 4 HOURS PRN
Status: DISCONTINUED | OUTPATIENT
Start: 2024-04-17 | End: 2024-04-19 | Stop reason: HOSPADM

## 2024-04-17 RX ORDER — SODIUM CHLORIDE 0.9 % (FLUSH) 0.9 %
1-10 SYRINGE (ML) INJECTION AS NEEDED
Status: DISCONTINUED | OUTPATIENT
Start: 2024-04-17 | End: 2024-04-19 | Stop reason: HOSPADM

## 2024-04-17 RX ORDER — CARBOPROST TROMETHAMINE 250 UG/ML
250 INJECTION, SOLUTION INTRAMUSCULAR AS NEEDED
Status: DISCONTINUED | OUTPATIENT
Start: 2024-04-17 | End: 2024-04-17 | Stop reason: HOSPADM

## 2024-04-17 RX ORDER — BUPIVACAINE HYDROCHLORIDE 2.5 MG/ML
INJECTION, SOLUTION EPIDURAL; INFILTRATION; INTRACAUDAL AS NEEDED
Status: DISCONTINUED | OUTPATIENT
Start: 2024-04-17 | End: 2024-04-17 | Stop reason: SURG

## 2024-04-17 RX ORDER — LIDOCAINE HYDROCHLORIDE 10 MG/ML
0.5 INJECTION, SOLUTION EPIDURAL; INFILTRATION; INTRACAUDAL; PERINEURAL ONCE AS NEEDED
Status: DISCONTINUED | OUTPATIENT
Start: 2024-04-17 | End: 2024-04-17 | Stop reason: HOSPADM

## 2024-04-17 RX ORDER — HYDROCODONE BITARTRATE AND ACETAMINOPHEN 5; 325 MG/1; MG/1
1 TABLET ORAL EVERY 4 HOURS PRN
Status: DISCONTINUED | OUTPATIENT
Start: 2024-04-17 | End: 2024-04-17 | Stop reason: HOSPADM

## 2024-04-17 RX ORDER — ONDANSETRON 2 MG/ML
4 INJECTION INTRAMUSCULAR; INTRAVENOUS EVERY 6 HOURS PRN
Status: DISCONTINUED | OUTPATIENT
Start: 2024-04-17 | End: 2024-04-19 | Stop reason: HOSPADM

## 2024-04-17 RX ORDER — BISACODYL 10 MG
10 SUPPOSITORY, RECTAL RECTAL DAILY PRN
Status: DISCONTINUED | OUTPATIENT
Start: 2024-04-18 | End: 2024-04-19 | Stop reason: HOSPADM

## 2024-04-17 RX ORDER — IBUPROFEN 600 MG/1
600 TABLET ORAL EVERY 6 HOURS PRN
Status: DISCONTINUED | OUTPATIENT
Start: 2024-04-17 | End: 2024-04-17 | Stop reason: HOSPADM

## 2024-04-17 RX ORDER — OXYTOCIN/0.9 % SODIUM CHLORIDE 30/500 ML
250 PLASTIC BAG, INJECTION (ML) INTRAVENOUS CONTINUOUS
Status: DISCONTINUED | OUTPATIENT
Start: 2024-04-17 | End: 2024-04-17

## 2024-04-17 RX ORDER — MORPHINE SULFATE 2 MG/ML
1 INJECTION, SOLUTION INTRAMUSCULAR; INTRAVENOUS EVERY 4 HOURS PRN
Status: DISCONTINUED | OUTPATIENT
Start: 2024-04-17 | End: 2024-04-17 | Stop reason: HOSPADM

## 2024-04-17 RX ORDER — OXYTOCIN/0.9 % SODIUM CHLORIDE 30/500 ML
2-20 PLASTIC BAG, INJECTION (ML) INTRAVENOUS
Status: DISCONTINUED | OUTPATIENT
Start: 2024-04-17 | End: 2024-04-17

## 2024-04-17 RX ORDER — MORPHINE SULFATE 2 MG/ML
2 INJECTION, SOLUTION INTRAMUSCULAR; INTRAVENOUS EVERY 4 HOURS PRN
Status: DISCONTINUED | OUTPATIENT
Start: 2024-04-17 | End: 2024-04-17 | Stop reason: HOSPADM

## 2024-04-17 RX ORDER — HYDROCODONE BITARTRATE AND ACETAMINOPHEN 5; 325 MG/1; MG/1
1 TABLET ORAL EVERY 4 HOURS PRN
Status: DISCONTINUED | OUTPATIENT
Start: 2024-04-17 | End: 2024-04-19 | Stop reason: HOSPADM

## 2024-04-17 RX ORDER — FAMOTIDINE 10 MG/ML
20 INJECTION, SOLUTION INTRAVENOUS ONCE AS NEEDED
Status: DISCONTINUED | OUTPATIENT
Start: 2024-04-17 | End: 2024-04-17 | Stop reason: HOSPADM

## 2024-04-17 RX ORDER — CITRIC ACID/SODIUM CITRATE 334-500MG
30 SOLUTION, ORAL ORAL ONCE
Status: COMPLETED | OUTPATIENT
Start: 2024-04-17 | End: 2024-04-17

## 2024-04-17 RX ORDER — OXYTOCIN/0.9 % SODIUM CHLORIDE 30/500 ML
125 PLASTIC BAG, INJECTION (ML) INTRAVENOUS ONCE AS NEEDED
Status: DISCONTINUED | OUTPATIENT
Start: 2024-04-17 | End: 2024-04-17

## 2024-04-17 RX ORDER — LIDOCAINE HYDROCHLORIDE AND EPINEPHRINE 15; 5 MG/ML; UG/ML
INJECTION, SOLUTION EPIDURAL AS NEEDED
Status: DISCONTINUED | OUTPATIENT
Start: 2024-04-17 | End: 2024-04-17 | Stop reason: SURG

## 2024-04-17 RX ORDER — EPHEDRINE SULFATE 5 MG/ML
10 INJECTION INTRAVENOUS
Status: DISCONTINUED | OUTPATIENT
Start: 2024-04-17 | End: 2024-04-17 | Stop reason: HOSPADM

## 2024-04-17 RX ORDER — MAGNESIUM CARB/ALUMINUM HYDROX 105-160MG
30 TABLET,CHEWABLE ORAL ONCE
Status: DISCONTINUED | OUTPATIENT
Start: 2024-04-17 | End: 2024-04-17 | Stop reason: HOSPADM

## 2024-04-17 RX ORDER — EPHEDRINE SULFATE 5 MG/ML
INJECTION INTRAVENOUS
Status: DISCONTINUED
Start: 2024-04-17 | End: 2024-04-19 | Stop reason: HOSPADM

## 2024-04-17 RX ORDER — SODIUM CHLORIDE 9 MG/ML
40 INJECTION, SOLUTION INTRAVENOUS AS NEEDED
Status: DISCONTINUED | OUTPATIENT
Start: 2024-04-17 | End: 2024-04-17 | Stop reason: HOSPADM

## 2024-04-17 RX ORDER — ACETAMINOPHEN 325 MG/1
650 TABLET ORAL EVERY 6 HOURS PRN
Status: DISCONTINUED | OUTPATIENT
Start: 2024-04-17 | End: 2024-04-19 | Stop reason: HOSPADM

## 2024-04-17 RX ORDER — SODIUM CHLORIDE 0.9 % (FLUSH) 0.9 %
10 SYRINGE (ML) INJECTION AS NEEDED
Status: DISCONTINUED | OUTPATIENT
Start: 2024-04-17 | End: 2024-04-17 | Stop reason: HOSPADM

## 2024-04-17 RX ORDER — SODIUM CHLORIDE, SODIUM LACTATE, POTASSIUM CHLORIDE, CALCIUM CHLORIDE 600; 310; 30; 20 MG/100ML; MG/100ML; MG/100ML; MG/100ML
125 INJECTION, SOLUTION INTRAVENOUS CONTINUOUS
Status: DISCONTINUED | OUTPATIENT
Start: 2024-04-17 | End: 2024-04-17

## 2024-04-17 RX ORDER — CITRIC ACID/SODIUM CITRATE 334-500MG
30 SOLUTION, ORAL ORAL ONCE
Status: DISCONTINUED | OUTPATIENT
Start: 2024-04-17 | End: 2024-04-17

## 2024-04-17 RX ORDER — ONDANSETRON 2 MG/ML
4 INJECTION INTRAMUSCULAR; INTRAVENOUS ONCE AS NEEDED
Status: COMPLETED | OUTPATIENT
Start: 2024-04-17 | End: 2024-04-17

## 2024-04-17 RX ORDER — OXYTOCIN/0.9 % SODIUM CHLORIDE 30/500 ML
999 PLASTIC BAG, INJECTION (ML) INTRAVENOUS ONCE
Status: DISCONTINUED | OUTPATIENT
Start: 2024-04-17 | End: 2024-04-17 | Stop reason: HOSPADM

## 2024-04-17 RX ADMIN — SODIUM CHLORIDE 5 MILLION UNITS: 900 INJECTION INTRAVENOUS at 08:16

## 2024-04-17 RX ADMIN — EPHEDRINE SULFATE 10 MG: 5 INJECTION INTRAVENOUS at 09:59

## 2024-04-17 RX ADMIN — ONDANSETRON 4 MG: 2 INJECTION INTRAMUSCULAR; INTRAVENOUS at 14:43

## 2024-04-17 RX ADMIN — DOCUSATE SODIUM 100 MG: 100 CAPSULE, LIQUID FILLED ORAL at 19:55

## 2024-04-17 RX ADMIN — SODIUM CHLORIDE, POTASSIUM CHLORIDE, SODIUM LACTATE AND CALCIUM CHLORIDE 125 ML/HR: 600; 310; 30; 20 INJECTION, SOLUTION INTRAVENOUS at 08:08

## 2024-04-17 RX ADMIN — HYDROCORTISONE 1 APPLICATION: 25 CREAM TOPICAL at 18:28

## 2024-04-17 RX ADMIN — Medication 1 APPLICATION: at 18:28

## 2024-04-17 RX ADMIN — WITCH HAZEL: 500 SOLUTION RECTAL; TOPICAL at 18:28

## 2024-04-17 RX ADMIN — ACETAMINOPHEN 650 MG: 325 TABLET ORAL at 15:37

## 2024-04-17 RX ADMIN — SODIUM CITRATE AND CITRIC ACID MONOHYDRATE 30 ML: 500; 334 SOLUTION ORAL at 14:56

## 2024-04-17 RX ADMIN — LIDOCAINE HYDROCHLORIDE AND EPINEPHRINE 3 ML: 15; 5 INJECTION, SOLUTION EPIDURAL at 09:29

## 2024-04-17 RX ADMIN — SODIUM CHLORIDE, POTASSIUM CHLORIDE, SODIUM LACTATE AND CALCIUM CHLORIDE 125 ML/HR: 600; 310; 30; 20 INJECTION, SOLUTION INTRAVENOUS at 09:30

## 2024-04-17 RX ADMIN — ROPIVACAINE HYDROCHLORIDE 14 ML/HR: 2 INJECTION, SOLUTION EPIDURAL; INFILTRATION at 09:36

## 2024-04-17 RX ADMIN — IBUPROFEN 600 MG: 600 TABLET, FILM COATED ORAL at 19:55

## 2024-04-17 RX ADMIN — LIDOCAINE HYDROCHLORIDE AND EPINEPHRINE 2 ML: 15; 5 INJECTION, SOLUTION EPIDURAL at 09:30

## 2024-04-17 RX ADMIN — BUPIVACAINE HYDROCHLORIDE 10 ML: 2.5 INJECTION, SOLUTION EPIDURAL; INFILTRATION; INTRACAUDAL; PERINEURAL at 09:31

## 2024-04-17 RX ADMIN — FENTANYL CITRATE 100 MCG: 50 INJECTION, SOLUTION INTRAMUSCULAR; INTRAVENOUS at 09:31

## 2024-04-17 RX ADMIN — PENICILLIN G 3 MILLION UNITS: 3000000 INJECTION, SOLUTION INTRAVENOUS at 12:06

## 2024-04-17 RX ADMIN — Medication 2 MILLI-UNITS/MIN: at 10:33

## 2024-04-17 RX ADMIN — EPHEDRINE SULFATE 10 MG: 5 INJECTION INTRAVENOUS at 10:31

## 2024-04-17 RX ADMIN — BENZOCAINE AND LEVOMENTHOL: 200; 5 SPRAY TOPICAL at 18:28

## 2024-04-17 RX ADMIN — ACETAMINOPHEN 650 MG: 325 TABLET ORAL at 22:46

## 2024-04-17 RX ADMIN — EPHEDRINE SULFATE 10 MG: 5 INJECTION INTRAVENOUS at 10:20

## 2024-04-17 NOTE — L&D DELIVERY NOTE
" [facility]   Vaginal Delivery Note    Patient Name: Shasta Rogers  : 1985  MRN: [id number]    Date of Delivery: 2024     Diagnosis     Pre & Post-Delivery:  Intrauterine pregnancy at 39w1d  Labor status: Spontaneous Onset of Labor     Group B streptococcal infection during pregnancy    Pregnancy     (normal spontaneous vaginal delivery)             Problem List    Transfer to Postpartum     Review the Delivery Report for details.     Delivery     Delivery:      YOB: 2024    Time of Birth:  Gestational Age 3:19 PM   39w1d     Anesthesia: Epidural     Delivering clinician: Debora Prajapati    Forceps?   No   Vacuum? No    Shoulder dystocia present: No        Delivery narrative: Patient progressed to complete/complete/0 station.  She was pushing for approximately 15 minutes with minimal progress.  It was felt the baby was in OP position.  I aided in rotation of baby to OA position.  With the next contraction, head delivered without complication in straight OA position.  Note shoulders and body easily followed.  Vigorous male placed on maternal and abdomen.  Nose and mouth were bulb suction.  After 1 minute, cord was clamped x 2.  Father cut cord.  Cord blood was obtained.  Placenta delivered spontaneously and intact.  No lacerations noted.      Infant     Findings: male  infant     Infant observations: Weight: 3595 g (7 lb 14.8 oz)   Length: 19.75  in  Observations/Comments:        Apgars: 7  @ 1 minute /    9  @ 5 minutes   Infant Name: unknown     Placenta & Cord         Placenta delivered  Spontaneous  at   2024  3:22 PM     Cord: 3 vessels  present.   Nuchal Cord?  no   Cord blood obtained: Yes    Cord gases obtained:  No    Cord gas results: Venous:  No results found for: \"PHCVEN\", \"BECVEN\"    Arterial:  No results found for: \"PHCART\", \"BECART\"     Repair     Episiotomy: None     No    Lacerations: No   Estimated Blood Loss: Est. Blood Loss (mL): 50 mL (Filed from " Delivery Summary) (04/17/24 1519)     Quantitative Blood Loss:          Complications     none    Disposition     Mother to Mother Baby/Postpartum  in stable condition currently.  Baby to remains with mom  in stable condition currently.    Debora Prajapati MD  04/17/24  15:40 EDT

## 2024-04-17 NOTE — ANESTHESIA PROCEDURE NOTES
Labor Epidural      Patient reassessed immediately prior to procedure    Patient location during procedure: OB  Performed By  Anesthesiologist: Donta Cid DO  Preanesthetic Checklist  Completed: patient identified, IV checked, site marked, risks and benefits discussed, surgical consent, monitors and equipment checked, pre-op evaluation and timeout performed  Additional Notes  Dural puncture performed with a 5 inch 25 g Davey needle, clear CSF.  Prep:  Pt Position:sitting  Sterile Tech:gloves, mask, sterile barrier and cap  Prep:chlorhexidine gluconate and isopropyl alcohol  Monitoring:blood pressure monitoring and continuous pulse oximetry  Epidural Block Procedure:  Approach:midline  Guidance:landmark technique and palpation technique  Location:L3-L4  Needle Type:Tuohy  Needle Gauge:17 G  Loss of Resistance Medium: air  Loss of Resistance: 5cm  Cath Depth at skin:11 cm  Paresthesia: none  Aspiration:negative  Test Dose:negative  Number of Attempts: 1  Post Assessment:  Dressing:secured with tape and occlusive dressing applied (Tegaderm Placed)  Pt Tolerance:patient tolerated the procedure well with no apparent complications  Complications:no

## 2024-04-17 NOTE — H&P
History and Physical:    Subjective     Chief Complaint   Patient presents with    Contractions       Shasta Rogers is a 39 y.o. year old  with an Estimated Date of Delivery: 24 currently at 39w1d presenting with regular contractions and leaking fluid.    Prenatal care has been with Debora Prajapati MD.  It has been significant for AMA (genetic screening was normal) and obesity .      Review of Systems   Constitutional: Negative.    HENT: Negative.     Respiratory: Negative.     Cardiovascular: Negative.    Gastrointestinal: Negative.    Genitourinary: Negative.    Musculoskeletal: Negative.    Skin: Negative.    Allergic/Immunologic: Negative.    Neurological: Negative.    Hematological: Negative.    Psychiatric/Behavioral: Negative.             Past Medical History:   Diagnosis Date    Abnormal Pap smear of cervix     Back pain     History of asthma     childhood - resolved    Ovarian cyst      Past Surgical History:   Procedure Laterality Date    DILATATION AND CURETTAGE       Family History   Problem Relation Age of Onset    Clotting disorder Other     Colon cancer Other     Hypertension Other     Ovarian cancer Other     Stroke Other     Alzheimer's disease Other     Aneurysm Other         brain     Social History     Tobacco Use    Smoking status: Never    Smokeless tobacco: Never   Vaping Use    Vaping status: Never Used   Substance Use Topics    Alcohol use: Not Currently    Drug use: No     Medications Prior to Admission   Medication Sig Dispense Refill Last Dose    prenatal vitamin tablet Take  by mouth Daily.   2024     Allergies:  Latex  OB History    Para Term  AB Living   3 1 1 0 1 1   SAB IAB Ectopic Molar Multiple Live Births   1 0 0 0 0 1      # Outcome Date GA Lbr Nikko/2nd Weight Sex Type Anes PTL Lv   3 Current            2 SAB 10/01/22 10w0d          1 Term 19 37w6d 11:35 / 02:43 3110 g (6 lb 13.7 oz) F Vag-Spont EPI N KRIS      Obstetric Comments  "  Fob #1 - Pregnancy #1, #2, and #3              Objective     /90   Pulse 108   Resp 16   Ht 165.1 cm (65\")   Wt 95.3 kg (210 lb)   LMP 07/18/2023   SpO2 100%   Breastfeeding No   BMI 34.95 kg/m²     Physical Exam    General:  No acute distress   Lung: Clear to auscultation bilaterally, no wheezing, clear at bases   Heart: Regular rate and rhythm, no murmurs    Abdomen: Gravid, nontender   Extremities: 2+ DTR's bilaterally, no edema   FHT's: reactive    Cervix: 3-4/90/-2 per nurse check.  Positive clear fluid.   [place]: Contraction are irregular, difficult to monitor.           Lab Review   External Prenatal Results       Pregnancy Outside Results - Transcribed From Office Records - See Scanned Records For Details       Test Value Date Time    ABO  A  04/17/24 0808    Rh  Positive  04/17/24 0808    Antibody Screen  Negative  04/17/24 0808       Negative  02/01/24 1041       Negative  09/13/23 1121    Varicella IgG       Rubella  1.17 index 09/13/23 1121    Hgb  13.0 g/dL 04/17/24 0808       12.6 g/dL 04/04/24        12.6 g/dL 03/18/24 1437       12.6 g/dL 03/18/24 1437       12.3 g/dL 02/01/24 1041       13.7 g/dL 09/13/23 1121    Hct  38.6 % 04/17/24 0808       37.7 % 04/04/24        37.5 % 03/18/24 1437       37.5 % 03/18/24 1437       36.5 % 02/01/24 1041       40.9 % 09/13/23 1121    Glucose Fasting GTT       Glucose Tolerance Test 1 hour ^ 109  05/31/19     Glucose Tolerance Test 3 hour       Gonorrhea (discrete)  Negative  09/13/23 1121    Chlamydia (discrete)  Negative  09/13/23 1121    RPR  Non Reactive  02/01/24 1041       Non Reactive  09/13/23 1121    VDRL       Syphilis Antibody       HBsAg  Negative  09/13/23 1121    Herpes Simplex Virus PCR       Herpes Simplex VIrus Culture       HIV  Non Reactive  09/13/23 1121    Hep C RNA Quant PCR       Hep C Antibody  Non Reactive  09/13/23 1121    AFP       Group B Strep  Streptococcus agalactiae (Group B)  07/24/19 1733    GBS Susceptibility to " Clindamycin       GBS Susceptibility to Erythromycin       Fetal Fibronectin       Genetic Testing, Maternal Blood                 Drug Screening       Test Value Date Time    Urine Drug Screen       Amphetamine Screen  Negative  04/17/24 0821    Barbiturate Screen  Negative  04/17/24 0821    Benzodiazepine Screen  Negative  04/17/24 0821    Methadone Screen  Negative  04/17/24 0821    Phencyclidine Screen  Negative  04/17/24 0821    Opiates Screen  Negative  04/17/24 0821    THC Screen  Negative  04/17/24 0821    Cocaine Screen       Propoxyphene Screen  Negative ng/mL 09/12/22 1106    Buprenorphine Screen  Negative  04/17/24 0821    Methamphetamine Screen       Oxycodone Screen  Negative  04/17/24 0821    Tricyclic Antidepressants Screen  Negative  04/17/24 0821              Legend    ^: Historical                                Lab Results   Component Value Date    ALKPHOS 135 (H) 04/17/2024    ALT 7 04/17/2024    AST 23 04/17/2024    CREATININE 0.51 (L) 04/17/2024    BILITOT 0.2 04/17/2024     (H) 04/17/2024    URICACID 3.7 04/17/2024     Lab Results   Component Value Date    WBC 11.92 (H) 04/17/2024    HGB 13.0 04/17/2024    HCT 38.6 04/17/2024    MCV 87.3 04/17/2024     04/17/2024        Results for orders placed in visit on 03/28/24     Ob Follow Up Transabdominal Approach    Narrative  PAT NAME: SALOMÓN LING  MED REC#: [id number]  BIRTH DA: 1985  PAT GEND: F  ACCOUNT#: [id number]  PAT TYPE: O  EXAM PETEY: <OBR.7.1>[id number]</OBR.7.1><OBR.7.1>[id number]</OBR.7.1>  REF PHYS MADELIN JULES  ACCESSION [id number]      Indication  ========    Evaluation of fetal growth, ama, Obesity      Comparison Studies  =================    2/29/24      History  ======    General History  Height 166 cm  Height (ft) 5 ft  Height (in) 5 in      Method  =======    Voluson E6, Transabdominal ultrasound examination. View: Adequate view      Pregnancy  =========    Krishnan pregnancy. Number of  fetuses: 1      Dating  ======    LMP on: 2023  GA by LMP 36 w + 2 d  DARSHANA by LMP: 2024  Method of dating: based on stated DARSHANA  GA by prior assessment 36 w + 2 d  DARSHANA by prior assessment: 2024  Ultrasound examination on: 3/28/2024  GA by U/S based upon: AC, BPD, Femur, HC  GA by U/S 35 w + 5 d  DARSHANA by U/S: 2024  Previous dating: based on the LMP, selected on 2024  Agreed DARSHANA of previous datin2024  Assigned: based on stated DARSHANA, selected on 2024  Assigned GA 36 w + 2 d  Assigned DARSHANA: 2024  Pregnancy length 280 d      General Evaluation  ================    Cardiac activity present.  bpm.  Fetal movements visualized.  Presentation cephalic.  Placenta Placental site: posterior.  Umbilical cord Cord vessels: 3 vessel cord.  Amniotic fluid Amount of AF: normal. MVP 3.8 cm. SAI 9.7 cm. Q1 3.3 cm, Q2 3.8 cm, Q3 2.6 cm, Q4 0.0 cm.      Biophysical Profile  ===============    2: Fetal breathing movements  2: Gross body movements  2: Fetal tone  2: Amniotic fluid volume  8 Biophysical profile score      Fetal Biometry  ============    Standard  BPD 84.9 mm  34w 1d        10%        Hadlock    .8 mm  36w 3d        25%        Hadlock    .5 mm  36w 2d        61%        Hadlock    Femur 70.6 mm  36w 1d        44%        Hadlock    HC / AC 1.00    EFW 2,833 g  44%        Domo    EFW (lb) 6 lb  EFW (oz) 4 oz  EFW by: Hadlock (BPD-HC-AC-FL)  Other: An ultrasound for fetal weight has a margin of error of up to twenty percent.  Extended   5.4 mm  Extremities / Bony Struc  FL / BPD 0.83    FL / HC 0.22    FL / AC 0.22    Other Structures   bpm      Fetal Anatomy  ============    Cranium: Appears normal  Lateral ventricles: Appears normal  Midline falx: Appears normal  Cavum septi pellucidi: Appears normal  Profile: Appears normal  4-chamber view: Appears normal  Stomach: Appears normal  Kidneys: Appears normal  Bladder: Appears normal  Gender: male  Wants to  know gender: yes      Impression  ==========    Male fetus in cephalic presentation with fetal heart rate of 130. Posterior placenta and three-vessel cord noted. Normal fluid volume. BPP 8 out of 8. Estimated fetal  weight 6 pounds 4 ounces which is 44th percentile.      Recommendation  ===============    Return for follow-up as clinically indicated.      Indication  ========    Evaluation of fetal growth, ama, Obesity      Comparison Studies  =================    24      History  ======    General History  Height 166 cm  Height (ft) 5 ft  Height (in) 5 in      Method  =======    Voluson E6, Transabdominal ultrasound examination. View: Adequate view      Pregnancy  =========    Krishnan pregnancy. Number of fetuses: 1      Dating  ======    LMP on: 2023  GA by LMP 36 w + 2 d  DARSHANA by LMP: 2024  Method of dating: based on stated DARSHANA  GA by prior assessment 36 w + 2 d  DARSHANA by prior assessment: 2024  Ultrasound examination on: 3/28/2024  GA by U/S based upon: AC, BPD, Femur, HC  GA by U/S 35 w + 5 d  DARSHANA by U/S: 2024  Previous dating: based on the LMP, selected on 2024  Agreed DARSHANA of previous datin2024  Assigned: based on stated DARSHANA, selected on 2024  Assigned GA 36 w + 2 d  Assigned DARSHANA: 2024  Pregnancy length 280 d      General Evaluation  ================    Cardiac activity present.  bpm.  Fetal movements visualized.  Presentation cephalic.  Placenta Placental site: posterior.  Umbilical cord Cord vessels: 3 vessel cord.  Amniotic fluid Amount of AF: normal. MVP 3.8 cm. SAI 9.7 cm. Q1 3.3 cm, Q2 3.8 cm, Q3 2.6 cm, Q4 0.0 cm.      Biophysical Profile  ===============    2: Fetal breathing movements  2: Gross body movements  2: Fetal tone  2: Amniotic fluid volume  8/8 Biophysical profile score      Fetal Biometry  ============    Standard  BPD 84.9 mm  34w 1d        10%        Hadlock    .8 mm  36w 3d        25%        Hadlock    .5 mm  36w 2d       "  61%        Hadlock    Femur 70.6 mm  36w 1d        44%        Hadlock    HC / AC 1.00    EFW 2,833 g  44%        Domo    EFW (lb) 6 lb  EFW (oz) 4 oz  EFW by: Hadlock (BPD-HC-AC-FL)  Other: An ultrasound for fetal weight has a margin of error of up to twenty percent.  Extended   5.4 mm  Extremities / Bony Struc  FL / BPD 0.83    FL / HC 0.22    FL / AC 0.22    Other Structures   bpm      Fetal Anatomy  ============    Cranium: Appears normal  Lateral ventricles: Appears normal  Midline falx: Appears normal  Cavum septi pellucidi: Appears normal  Profile: Appears normal  4-chamber view: Appears normal  Stomach: Appears normal  Kidneys: Appears normal  Bladder: Appears normal  Gender: male  Wants to know gender: yes      Impression  ==========    Male fetus in cephalic presentation with fetal heart rate of 130. Posterior placenta and three-vessel cord noted. Normal fluid volume. BPP 8 out of 8. Estimated fetal  weight 6 pounds 4 ounces which is 44th percentile.      Recommendation  ===============    Return for follow-up as clinically indicated.        Sonographer: Mary Mancera RDMS  Physician: Debora Prajapati MD, FACOG    Electronically signed by: Debora Prajapati MD, FACOG at:  11:56              Patient Active Problem List    Diagnosis Date Noted    Pregnancy 2024    Group B streptococcal infection during pregnancy 2023     Note Last Updated: 3/11/2024     In urine at NOB no allergy to pcn      Obesity in pregnancy, antepartum 2023     Note Last Updated: 2024     Hgb A1C at NOB 5.3  Early 1 hr gtt 101  Recommended limiting weight gain to 15-20lbs  Growth US at 32 at PDC 4 pounds 8 ounces which is 63rd percentile  Baby asa 12-36wk  Repeat growth here at 37 weeks.  24 cephalic; ; SAI 11.7cm, BPP 8/8      Prenatal care, antepartum 2023     Note Last Updated: 3/28/2024      at 37w6d ROM with JTA-6 lbs 13.7 oz- no complications \" Meenu\"    IOL  @ [facility]      " AMA (advanced maternal age) multigravida 35+ 09/12/2022     Note Last Updated: 11/14/2023     cfDNA negative.  Boy!  On daily baby aspirin. PDC anatomy          Assessment & Plan     ASSESSMENT  IUP at 39w1d  labor and rupture of membranes  AMA-cell free DNA testing negative.   3. GBBS positive    PLAN  Admit to labor and delivery   2.   Pitocin if labor needs to be augmented.  and antibiotics for GBBS prophylaxis         Debora Prajapati MD  4/17/2024@

## 2024-04-17 NOTE — ANESTHESIA PREPROCEDURE EVALUATION
Anesthesia Evaluation     Patient summary reviewed and Nursing notes reviewed                Airway   Mallampati: II  TM distance: >3 FB  Neck ROM: full  No difficulty expected  Dental      Pulmonary - negative pulmonary ROS   Cardiovascular - negative cardio ROS        Neuro/Psych- negative ROS  GI/Hepatic/Renal/Endo - negative ROS     Musculoskeletal     (+) back pain  Abdominal    Substance History - negative use     OB/GYN    (+) Pregnant        Other                    Anesthesia Plan    ASA 2     epidural       Anesthetic plan, risks, benefits, and alternatives have been provided, discussed and informed consent has been obtained with: patient.    CODE STATUS:    Level Of Support Discussed With: Patient  Code Status (Patient has no pulse and is not breathing): CPR (Attempt to Resuscitate)  Medical Interventions (Patient has pulse or is breathing): Full Support

## 2024-04-18 LAB
BASOPHILS # BLD AUTO: 0.04 10*3/MM3 (ref 0–0.2)
BASOPHILS NFR BLD AUTO: 0.3 % (ref 0–1.5)
DEPRECATED RDW RBC AUTO: 44.5 FL (ref 37–54)
EOSINOPHIL # BLD AUTO: 0.11 10*3/MM3 (ref 0–0.4)
EOSINOPHIL NFR BLD AUTO: 0.8 % (ref 0.3–6.2)
ERYTHROCYTE [DISTWIDTH] IN BLOOD BY AUTOMATED COUNT: 13.4 % (ref 12.3–15.4)
HCT VFR BLD AUTO: 33.2 % (ref 34–46.6)
HGB BLD-MCNC: 10.9 G/DL (ref 12–15.9)
IMM GRANULOCYTES # BLD AUTO: 0.06 10*3/MM3 (ref 0–0.05)
IMM GRANULOCYTES NFR BLD AUTO: 0.4 % (ref 0–0.5)
LYMPHOCYTES # BLD AUTO: 2.83 10*3/MM3 (ref 0.7–3.1)
LYMPHOCYTES NFR BLD AUTO: 20 % (ref 19.6–45.3)
MCH RBC QN AUTO: 29.7 PG (ref 26.6–33)
MCHC RBC AUTO-ENTMCNC: 32.8 G/DL (ref 31.5–35.7)
MCV RBC AUTO: 90.5 FL (ref 79–97)
MONOCYTES # BLD AUTO: 1.51 10*3/MM3 (ref 0.1–0.9)
MONOCYTES NFR BLD AUTO: 10.7 % (ref 5–12)
NEUTROPHILS NFR BLD AUTO: 67.8 % (ref 42.7–76)
NEUTROPHILS NFR BLD AUTO: 9.6 10*3/MM3 (ref 1.7–7)
NRBC BLD AUTO-RTO: 0 /100 WBC (ref 0–0.2)
PLATELET # BLD AUTO: 259 10*3/MM3 (ref 140–450)
PMV BLD AUTO: 11.7 FL (ref 6–12)
RBC # BLD AUTO: 3.67 10*6/MM3 (ref 3.77–5.28)
WBC NRBC COR # BLD AUTO: 14.15 10*3/MM3 (ref 3.4–10.8)

## 2024-04-18 PROCEDURE — 85025 COMPLETE CBC W/AUTO DIFF WBC: CPT | Performed by: OBSTETRICS & GYNECOLOGY

## 2024-04-18 RX ADMIN — IBUPROFEN 600 MG: 600 TABLET, FILM COATED ORAL at 11:17

## 2024-04-18 RX ADMIN — DOCUSATE SODIUM 100 MG: 100 CAPSULE, LIQUID FILLED ORAL at 07:27

## 2024-04-18 RX ADMIN — ACETAMINOPHEN 650 MG: 325 TABLET ORAL at 07:27

## 2024-04-18 RX ADMIN — ACETAMINOPHEN 650 MG: 325 TABLET ORAL at 15:15

## 2024-04-18 RX ADMIN — DOCUSATE SODIUM 100 MG: 100 CAPSULE, LIQUID FILLED ORAL at 20:49

## 2024-04-18 RX ADMIN — IBUPROFEN 600 MG: 600 TABLET, FILM COATED ORAL at 20:49

## 2024-04-18 NOTE — LACTATION NOTE
24 1140   Maternal Information   Date of Referral 24   Person Making Referral lactation consultant  (newly postpartum)   Maternal Reason for Referral breastfeeding unsuccessful in past  (Difficulty breastfeeding #1 - Exclusively pumped)   Infant Reason for Referral  infant   Maternal Assessment   Breast Size Issue none   Breast Shape Bilateral:;round   Breast Density Bilateral:;soft   Nipples Bilateral:;everted   Left Nipple Symptoms intact;nontender   Right Nipple Symptoms intact;nontender   Maternal Infant Feeding   Maternal Emotional State receptive;relaxed   Infant Positioning clutch/football  (right football.)   Signs of Milk Transfer deep jaw excursions noted   Pain with Feeding no   Comfort Measures Before/During Feeding maternal position adjusted;latch adjusted;infant position adjusted;suction broken using finger  (went over a good position and bringing infant quickly to the breast with a large gap opening.)   Latch Assistance minimal assistance   Support Person Involvement verbally supports mother   Milk Expression/Equipment   Breast Pump Type double electric, personal  (Medela pump)   Breast Pump Flange Type hard   Breast Pumping   Breast Pumping Interventions other (see comments)  (can pump for short or missed feeds)     Courtesy visit with breastfeeding mother.  Encouraged PRN lactation as needed and discussed the outpatient lactation clinic for any needs after discharge.  Went over basic breastfeeding information and provided written materials with a QR code to  and breastpump QR codes.  Encouraged mother to look at the hospital booklet starting on page 35 for breastfeeding information. Encouraged attempting to breastfeed every 3 hours or more often with feeding cues, using stimulation to encourage high quality milk transfer.

## 2024-04-18 NOTE — PROGRESS NOTES
Postpartum Progress Note    Patient name: Shasta Rogers  YOB: 1985   MRN: [id number]  Referring Provider: Debora Prajapati MD  Admission Date: 2024  Date of Service: 2024    ID: 39 y.o.     Diagnosis:   S/p vaginal delivery     Group B streptococcal infection during pregnancy    Pregnancy     (normal spontaneous vaginal delivery)       Subjective:      No complaints.  Moderate lochia.  Ambulating, voiding, tolerating diet.  Pain well controlled.  The patient is currently breastfeeding.   This baby is a male,  did circumcision today.    Objective:      Vital signs:  Vital Signs Range for the last 24 hours  Temperature: Temp:  [97.3 °F (36.3 °C)-98.8 °F (37.1 °C)] 98.2 °F (36.8 °C)   Temp Source: Temp src: Oral   BP: BP: (113-139)/(64-89) 113/71   Pulse: Heart Rate:  [] 89   Respirations: Resp:  [16-18] 18   Weight: 95.3 kg (210 lb)     General: Alert & oriented x4, in no apparent distress  Abdomen: soft, nontender  Uterus: firm, nontender  Extremities: nontender; no edema      Labs:  Lab Results   Component Value Date    WBC 14.15 (H) 2024    HGB 10.9 (L) 2024    HCT 33.2 (L) 2024    MCV 90.5 2024     2024     Results from last 7 days   Lab Units 24  0808   ABO TYPING  A   RH TYPING  Positive     External Prenatal Results       Pregnancy Outside Results - Transcribed From Office Records - See Scanned Records For Details       Test Value Date Time    ABO  A  24 0808    Rh  Positive  24 0808    Antibody Screen  Negative  24 0808       Negative  24 1041       Negative  23 1121    Varicella IgG       Rubella  1.17 index 23 1121    Hgb  10.9 g/dL 24 0633       13.0 g/dL 24 0808       12.6 g/dL 24        12.6 g/dL 24 1437       12.6 g/dL 24 1437       12.3 g/dL 24 1041       13.7 g/dL 23 1121    Hct  33.2 % 24 0633       38.6 % 24 0808        37.7 % 04/04/24        37.5 % 03/18/24 1437       37.5 % 03/18/24 1437       36.5 % 02/01/24 1041       40.9 % 09/13/23 1121    Glucose Fasting GTT       Glucose Tolerance Test 1 hour ^ 109  05/31/19     Glucose Tolerance Test 3 hour       Gonorrhea (discrete)  Negative  09/13/23 1121    Chlamydia (discrete)  Negative  09/13/23 1121    RPR  Non Reactive  02/01/24 1041       Non Reactive  09/13/23 1121    VDRL       Syphilis Antibody       HBsAg  Negative  09/13/23 1121    Herpes Simplex Virus PCR       Herpes Simplex VIrus Culture       HIV  Non Reactive  09/13/23 1121    Hep C RNA Quant PCR       Hep C Antibody  Non Reactive  09/13/23 1121    AFP       Group B Strep ^ Positive  09/13/23     GBS Susceptibility to Clindamycin       GBS Susceptibility to Erythromycin       Fetal Fibronectin       Genetic Testing, Maternal Blood                 Drug Screening       Test Value Date Time    Urine Drug Screen       Amphetamine Screen  Negative  04/17/24 0821    Barbiturate Screen  Negative  04/17/24 0821    Benzodiazepine Screen  Negative  04/17/24 0821    Methadone Screen  Negative  04/17/24 0821    Phencyclidine Screen  Negative  04/17/24 0821    Opiates Screen  Negative  04/17/24 0821    THC Screen  Negative  04/17/24 0821    Cocaine Screen       Propoxyphene Screen  Negative ng/mL 09/12/22 1106    Buprenorphine Screen  Negative  04/17/24 0821    Methamphetamine Screen       Oxycodone Screen  Negative  04/17/24 0821    Tricyclic Antidepressants Screen  Negative  04/17/24 0821              Legend    ^: Historical                            Assessment/Plan:      PPD#1 s/p vaginal delivery.  1. S/p vaginal delivery: Doing well.Continue routine postpartum care.    2. Infant feeding: Supportive care.  The patient is currently breastfeeding.  3. Asymptomatic postpartum anemia

## 2024-04-18 NOTE — ANESTHESIA POSTPROCEDURE EVALUATION
Patient: Shasta Rogers    Procedure Summary       Date: 04/17/24 Room / Location:     Anesthesia Start: 0918 Anesthesia Stop: 1522    Procedure: LABOR ANALGESIA Diagnosis:     Scheduled Providers:  Provider: Donta Cid DO    Anesthesia Type: epidural ASA Status: 2            Anesthesia Type: epidural    Vitals  Vitals Value Taken Time   /71 04/18/24 0815   Temp 98.2 °F (36.8 °C) 04/18/24 0815   Pulse 89 04/18/24 0815   Resp 18 04/18/24 0815   SpO2 99 % 04/17/24 1656           Post Anesthesia Care and Evaluation    Patient location during evaluation: bedside  Patient participation: complete - patient participated  Level of consciousness: awake and alert  Pain management: adequate    Airway patency: patent  Anesthetic complications: No anesthetic complications    Cardiovascular status: acceptable  Respiratory status: acceptable  Hydration status: acceptable  Post Neuraxial Block status: Motor and sensory function returned to baseline and No signs or symptoms of PDPH

## 2024-04-19 VITALS
OXYGEN SATURATION: 99 % | BODY MASS INDEX: 34.99 KG/M2 | SYSTOLIC BLOOD PRESSURE: 120 MMHG | TEMPERATURE: 98 F | HEART RATE: 84 BPM | RESPIRATION RATE: 18 BRPM | DIASTOLIC BLOOD PRESSURE: 84 MMHG | WEIGHT: 210 LBS | HEIGHT: 65 IN

## 2024-04-19 RX ORDER — IBUPROFEN 600 MG/1
600 TABLET ORAL EVERY 6 HOURS PRN
Qty: 30 TABLET | Refills: 0 | Status: SHIPPED | OUTPATIENT
Start: 2024-04-19

## 2024-04-19 RX ADMIN — DOCUSATE SODIUM 100 MG: 100 CAPSULE, LIQUID FILLED ORAL at 09:26

## 2024-04-19 RX ADMIN — IBUPROFEN 600 MG: 600 TABLET, FILM COATED ORAL at 06:57

## 2024-04-19 NOTE — DISCHARGE SUMMARY
Discharge Summary    Date of Admission: 2024  Date of Discharge:  2024      Patient: Shasta Rogers      MR#:[id number]    Delivery Provider: Debora Prajapati     Presenting Problem/History of Present Illness  Pregnancy [Z34.90]       Group B streptococcal infection during pregnancy    Pregnancy     (normal spontaneous vaginal delivery)         Discharge Diagnosis: Vaginal delivery at 39w1d    Procedures:  Vaginal, Spontaneous     2024    3:19 PM          Hospital Course  Patient is a 39 y.o. female  at 39w1d status post vaginal delivery without complication. Postpartum the patient did well. She remained afebrile, with vital signs stable. She was ready for discharge on postpartum day 2.     Infant:   male  fetus 3595 g (7 lb 14.8 oz)  with Apgar scores of 7 , 9  at five minutes.    Condition on Discharge:  Stable    Vital Signs  Temp:  [97.9 °F (36.6 °C)-98.2 °F (36.8 °C)] 98.2 °F (36.8 °C)  Heart Rate:  [82-91] 82  Resp:  [16-18] 16  BP: (113-131)/(71-83) 120/83    Lab Results   Component Value Date    WBC 14.15 (H) 2024    HGB 10.9 (L) 2024    HCT 33.2 (L) 2024    MCV 90.5 2024     2024       Discharge Disposition  Home or Self Care    Discharge Medications     Discharge Medications        New Medications        Instructions Start Date   ibuprofen 600 MG tablet  Commonly known as: ADVIL,MOTRIN   600 mg, Oral, Every 6 Hours PRN             Continue These Medications        Instructions Start Date   prenatal vitamin tablet   Oral, Daily                 Follow-up Appointments  No future appointments.  Additional Instructions for the Follow-ups that You Need to Schedule       Discharge Follow-up with Specified Provider: hans mcneill; 6 Weeks   As directed      To: hans mcneill   Follow Up: 6 Weeks                MARGARITA Solitario  24  08:03 EDT  Csd

## 2024-04-19 NOTE — LACTATION NOTE
04/19/24 1013   Maternal Information   Date of Referral 04/19/24   Person Making Referral lactation consultant  (courtesy visit)   Maternal Reason for Referral   (mother reporting all is going well with nursing infant, but is tender; reiterated what a deep latch should look like and to assess the latch as infant is nursing and if needed to break latch and relatch infant to maintain deeper latching.)     Provided cooling gel pads and soft shells for tenderness; encouraged to call lactation PRN and mentioned outpatient clinic after discharge if needed.

## 2024-04-19 NOTE — LACTATION NOTE
Courtesy f/u visit with pt. She states breastfeeding has gone much better since the LC came in earlier. He was circed today and had been a little sleepy. He has now been cluster feeding x 2 hours. They have no questions/concerns at this time. Encouraged to call lactation with any questions/concerns. Encouraged to call outpatient lactation prn after discharge.

## 2024-04-26 ENCOUNTER — TELEPHONE (OUTPATIENT)
Dept: OBSTETRICS AND GYNECOLOGY | Facility: CLINIC | Age: 39
End: 2024-04-26
Payer: COMMERCIAL

## 2024-04-29 ENCOUNTER — MATERNAL SCREENING (OUTPATIENT)
Dept: CALL CENTER | Facility: HOSPITAL | Age: 39
End: 2024-04-29
Payer: COMMERCIAL

## 2024-04-29 ENCOUNTER — TELEPHONE (OUTPATIENT)
Dept: OBSTETRICS AND GYNECOLOGY | Facility: CLINIC | Age: 39
End: 2024-04-29
Payer: COMMERCIAL

## 2024-04-29 DIAGNOSIS — N61.0 ACUTE MASTITIS: Primary | ICD-10-CM

## 2024-04-29 NOTE — TELEPHONE ENCOUNTER
Spoke with pt. She woke up today with low fever/chills/body aches, and tenderness/redness in the breast. She is two weeks postpartum and exclusively pumping. Her temp was 100.0 early in the morning, and 100.6 a few hours later. She is taking tylenol. Advised pt this does sound like mastitis, and we will treat with dicloxacillin QID [id number]. Alternate tylenol and ibuprofen. Warm compresses while pumping, can do cold compresses in between to help with inflammation. She is aware this antibiotic is safe for pregnancy. Continue to monitor temperature/symptoms, will need to be seen if fever does not subside in 48 hours. Call with worsening symptoms. Pt aware.

## 2024-04-29 NOTE — OUTREACH NOTE
Maternal Screening Survey      Flowsheet Row Responses   Facility patient discharged from? [facility]   Attempt successful? No   Unsuccessful attempts Attempt 2              Angelita NUNEZ - Registered Nurse

## 2024-04-29 NOTE — OUTREACH NOTE
Maternal Screening Survey      Flowsheet Row Responses   Facility patient discharged from? [facility]   Attempt successful? No   Unsuccessful attempts Attempt 1              Angelita NUNEZ - Registered Nurse

## 2024-04-29 NOTE — TELEPHONE ENCOUNTER
Patient is calling because she thinks she may have Mastitis. Patient states her breast are very painful, tender, red spots appearing on breast. Patient also states she is having body aches, chills and a fever.

## 2024-04-30 ENCOUNTER — MATERNAL SCREENING (OUTPATIENT)
Dept: CALL CENTER | Facility: HOSPITAL | Age: 39
End: 2024-04-30
Payer: COMMERCIAL

## 2024-04-30 NOTE — OUTREACH NOTE
Maternal Screening Survey      Flowsheet Row Responses   Facility patient discharged from? [facility]   Attempt successful? No   Unsuccessful attempts Attempt 3   Revoke Decline to participate              ARINA ANDRADE - Registered Nurse

## 2024-05-14 ENCOUNTER — TELEPHONE (OUTPATIENT)
Dept: OBSTETRICS AND GYNECOLOGY | Facility: CLINIC | Age: 39
End: 2024-05-14
Payer: COMMERCIAL

## 2024-05-14 NOTE — TELEPHONE ENCOUNTER
Patient of Dr. Prajapati; had  24. Was treated for mastitis with Dicloxacillin 24.   Returned patient's call.   States during the night she noted a thick white coating on her tongue with some blisters and thinks it may be thrush. States the blisters may be from scrubbing it with a toothbrush. Denies any other symptoms.   States her mastitis symptoms did resolve with treatment.   Asking if there is any OTC treatment for thrush of if she should see her PCP. Advised her to see her PCP or go to [facility]. She v/u and agreed.   
Provider: DR YUSUF    Caller: DR YUSUF    Relationship to Patient: SELF    Phone Number: [phone]    Reason for Call: PATIENT CALLED AND STATED THAT SHE THINKS SHE HAS THRUSH AND WOULD LIKE TO KNOW IF SHE SHOULD SEE DR YUSUF FOR THIS OR CALL HER PCP    When was the patient last seen: VAGINAL DELIVERY 4/17/24    Characteristics of symptom/severity: TONGUE IS WHITE AND BLISTERED / RECENTLY HAD MASTITIS AND WAS ON PENICILLIN FOR 10 DAYS (FINISHED ANTIBIOTIC LAST WEEK)     
- - -

## 2024-05-27 PROBLEM — Z34.90 PRENATAL CARE, ANTEPARTUM: Status: RESOLVED | Noted: 2023-09-13 | Resolved: 2024-05-27

## 2024-05-27 PROBLEM — O99.210 OBESITY IN PREGNANCY, ANTEPARTUM: Status: RESOLVED | Noted: 2023-09-13 | Resolved: 2024-05-27

## 2024-05-27 PROBLEM — B95.1 GROUP B STREPTOCOCCAL INFECTION DURING PREGNANCY: Status: RESOLVED | Noted: 2023-09-19 | Resolved: 2024-05-27

## 2024-05-27 PROBLEM — O09.529 AMA (ADVANCED MATERNAL AGE) MULTIGRAVIDA 35+: Status: RESOLVED | Noted: 2022-09-12 | Resolved: 2024-05-27

## 2024-05-27 PROBLEM — O98.819 GROUP B STREPTOCOCCAL INFECTION DURING PREGNANCY: Status: RESOLVED | Noted: 2023-09-19 | Resolved: 2024-05-27

## 2024-05-27 PROBLEM — Z34.90 PREGNANCY: Status: RESOLVED | Noted: 2024-04-17 | Resolved: 2024-05-27

## 2024-05-29 ENCOUNTER — POSTPARTUM VISIT (OUTPATIENT)
Dept: OBSTETRICS AND GYNECOLOGY | Facility: CLINIC | Age: 39
End: 2024-05-29
Payer: COMMERCIAL

## 2024-05-29 VITALS
BODY MASS INDEX: 30.99 KG/M2 | WEIGHT: 186 LBS | SYSTOLIC BLOOD PRESSURE: 122 MMHG | DIASTOLIC BLOOD PRESSURE: 80 MMHG | HEIGHT: 65 IN

## 2024-05-29 DIAGNOSIS — N39.3 URINARY, INCONTINENCE, STRESS FEMALE: ICD-10-CM

## 2024-05-29 RX ORDER — ACETAMINOPHEN AND CODEINE PHOSPHATE 120; 12 MG/5ML; MG/5ML
1 SOLUTION ORAL DAILY
Qty: 28 TABLET | Refills: 12 | Status: SHIPPED | OUTPATIENT
Start: 2024-05-29 | End: 2025-05-29

## 2024-05-29 RX ORDER — FLUCONAZOLE 100 MG/1
TABLET ORAL
COMMUNITY
Start: 2024-05-23

## 2024-05-29 NOTE — PROGRESS NOTES
Chief Complaint   Patient presents with    Postpartum Care       Postpartum Visit         Shasta Rogers is a 39 y.o.  who presents today for a 6 week(s) postpartum check.     C/S: no     Vaginal, Spontaneous    Information for the patient's :  Miguel Rogers [[id number]]   2024   male   Miguel Rogers   3595 g (7 lb 14.8 oz)   Gestational Age: 39w1d        Baby Discharged: Discharged with Mom  Delivering Physician: Debora Prajapati MD    Her pregnancy was complicated by no known issues. The patient did not have a laceration or episiotomy   Patient describes vaginal bleeding as light.  Patient is pumping.  She desires  POP  for contraception until possible vasectomy.      She would like to discuss the following complaints today: light cramping. Recently being treated for thrush with Diflucan, she is on her last day. She did want to discuss a white spot on the tip of her nipple that doesn't go away.    Patient denies concerns for postpartum depression/anxiety. Patient denies  suicidal or homicidal ideation. Her postpartum depression screening questionnaire: 2. No treatment is indicated      Last Pap : 22. Results: negative. HPV: not done. Her last HPV testing was 10/2020..   Last Completed Pap Smear            Ordered - PAP SMEAR (Every 3 Years) Ordered on 2022  SCANNED - PAP SMEAR    10/15/2020  Pap IG, HPV-hr    10/05/2020  SCANNED - PAP SMEAR                      The additional following portions of the patient's history were reviewed and updated as appropriate: allergies, current medications, past family history, past medical history, past social history, past surgical history, and problem list.    Review of Systems   Constitutional: Negative.    HENT: Negative.     Eyes: Negative.    Respiratory: Negative.     Cardiovascular: Negative.    Gastrointestinal: Negative.    Endocrine: Negative.    Genitourinary:  Positive for urinary incontinence.  "  Musculoskeletal: Negative.    Skin: Negative.    Allergic/Immunologic: Negative.    Neurological: Negative.    Hematological: Negative.    Psychiatric/Behavioral: Negative.       All other systems reviewed and are negative.     I have reviewed and agree with the HPI, ROS, and historical information as entered above. Debora Prajapati MD      /80   Ht 165.1 cm (65\")   Wt 84.4 kg (186 lb)   LMP 2023   Breastfeeding Yes   BMI 30.95 kg/m²     Physical Exam  Vitals and nursing note reviewed. Exam conducted with a chaperone present.   Constitutional:       Appearance: She is well-developed.   HENT:      Head: Normocephalic and atraumatic.   Pulmonary:      Effort: Pulmonary effort is normal.   Abdominal:      Palpations: Abdomen is soft. Abdomen is not rigid.   Genitourinary:     Vagina: No lesions or prolapsed vaginal walls.      Cervix: No lesion or erythema.      Uterus: Enlarged. Not tender and no uterine prolapse.       Adnexa:         Right: No mass, tenderness or fullness.          Left: No mass, tenderness or fullness.     Musculoskeletal:      Cervical back: Normal range of motion.   Neurological:      Mental Status: She is alert and oriented to person, place, and time.   Psychiatric:         Mood and Affect: Mood normal.         Behavior: Behavior normal.             Assessment and Plan    Problem List Items Addressed This Visit          Gravid and      (normal spontaneous vaginal delivery) - Primary    Overview     - baby boy weighing 7 pounds 15 ounces at 39 weeks and 1 day.          Other Visit Diagnoses       Postpartum follow-up        Relevant Medications    norethindrone (MICRONOR) 0.35 MG tablet    Other Relevant Orders    LIQUID-BASED PAP SMEAR WITH HPV GENOTYPING REGARDLESS OF INTERPRETATION (RAMSES,COR,MAD)    Urinary, incontinence, stress female        Relevant Orders    Ambulatory Referral to Physical Therapy for Evaluation & Treatment (Completed)      "       S/p Vaginal delivery, 6 week(s) postpartum.  Doing well.    Return to normal physical activity.  No pelvic restrictions.   Baby doing well.  Contraception: contraceptive methods: POP until possible vasectomy  Return in about 1 year (around 5/29/2025), or if symptoms worsen or fail to improve, for Annual physical.     Debora Prajapati MD  05/29/2024

## 2024-06-04 LAB — REF LAB TEST METHOD: NORMAL

## 2024-11-12 ENCOUNTER — OFFICE VISIT (OUTPATIENT)
Dept: OBSTETRICS AND GYNECOLOGY | Facility: CLINIC | Age: 39
End: 2024-11-12
Payer: COMMERCIAL

## 2024-11-12 VITALS
WEIGHT: 191.4 LBS | BODY MASS INDEX: 31.89 KG/M2 | SYSTOLIC BLOOD PRESSURE: 120 MMHG | DIASTOLIC BLOOD PRESSURE: 68 MMHG | HEIGHT: 65 IN

## 2024-11-12 DIAGNOSIS — R10.2 PELVIC PAIN: Primary | ICD-10-CM

## 2024-11-12 DIAGNOSIS — N92.0 MENORRHAGIA WITH REGULAR CYCLE: ICD-10-CM

## 2024-11-12 RX ORDER — IBUPROFEN 600 MG/1
600 TABLET, FILM COATED ORAL EVERY 6 HOURS PRN
Qty: 60 TABLET | Refills: 0 | Status: SHIPPED | OUTPATIENT
Start: 2024-11-12

## 2024-11-12 RX ORDER — DICYCLOMINE HYDROCHLORIDE 10 MG/1
10 CAPSULE ORAL 4 TIMES DAILY PRN
Qty: 60 CAPSULE | Refills: 0 | Status: SHIPPED | OUTPATIENT
Start: 2024-11-12 | End: 2025-11-12

## 2024-11-12 NOTE — PROGRESS NOTES
Follow-up        HPI  Shasta Rogers is a 39 y.o. female, , who presents for initial evaluation evaluation of pelvic pain.      The pain is located in the bilateral lower abdominal  and it does not radiate. She has experienced this problem for  2-3  weeks. She describes the pain as disturbance and announce and it occurs on and off since 1 month after delivery . She rates her pain score as a 3/10 when at its worst. Patient reports her pain is similar to ovarian cysts pain.  Patient notes aggravating factors include laying in bed and alleviating factors are nothing. Patient reports she has a h/o of ovarian cyst. The patient reports additional symptoms as none.  The patient has not previously been evaluated for pelvic pain. The patient is sexually active. She has not had new partners..    Did the patient have u/s today? No.    Her last LMP was Patient's last menstrual period was 2024 (exact date)..  Periods are  regular since having baby heavier , lasting 4 days.  Dysmenorrhea:moderate, occurring premenstrually and first 1-2 days of flow.      Problems with GI: no  History of urinary disease: no  Concern for Anxiety or Depression: yes  Exercises Regularly: walking  Tobacco Usage?: No     Additional OB/GYN History   Last Pap :   Last Completed Pap Smear            PAP SMEAR (Every 3 Years) Next due on 2024  LIQUID-BASED PAP SMEAR WITH HPV GENOTYPING REGARDLESS OF INTERPRETATION (RAMSES,COR,MAD)    2022  SCANNED - PAP SMEAR    10/15/2020  Pap IG, HPV-hr    10/05/2020  SCANNED - PAP SMEAR                    OB History          3    Para   2    Term   2       0    AB   1    Living   2         SAB   1    IAB   0    Ectopic   0    Molar   0    Multiple   0    Live Births   2          Obstetric Comments   Fob #1 - Pregnancy #1, #2, and #3                  Current Outpatient Medications:     dicyclomine (BENTYL) 10 MG capsule, Take 1 capsule by mouth 4 (Four) Times a  "Day As Needed for Abdominal Cramping., Disp: 60 capsule, Rfl: 0    fluconazole (DIFLUCAN) 100 MG tablet, , Disp: , Rfl:     ibuprofen (ADVIL,MOTRIN) 600 MG tablet, Take 1 tablet by mouth Every 6 (Six) Hours As Needed for Mild Pain., Disp: 60 tablet, Rfl: 0    norethindrone (MICRONOR) 0.35 MG tablet, Take 1 tablet by mouth Daily., Disp: 28 tablet, Rfl: 12    prenatal vitamin tablet, Take  by mouth Daily., Disp: , Rfl:      Past Medical History:   Diagnosis Date    Abnormal Pap smear of cervix     Back pain     History of asthma     childhood - resolved    Ovarian cyst         Past Surgical History:   Procedure Laterality Date    DILATATION AND CURETTAGE  2022       The additional following portions of the patient's history were reviewed and updated as appropriate: allergies and current medications.      Review of Systems   Genitourinary:  Positive for pelvic pain.   All other systems reviewed and are negative.    All other systems reviewed and are negative.     I have reviewed and agree with the HPI, ROS, and historical information as entered above. Sydnee Amaya, APRN      Objective   /68 (BP Location: Right arm, Patient Position: Sitting, Cuff Size: Adult)   Ht 165.1 cm (65\")   Wt 86.8 kg (191 lb 6.4 oz)   LMP 11/03/2024 (Exact Date)   Breastfeeding No   BMI 31.85 kg/m²     Physical Exam  Vitals and nursing note reviewed.   Constitutional:       General: She is not in acute distress.     Appearance: Normal appearance. She is not ill-appearing, toxic-appearing or diaphoretic.   Pulmonary:      Effort: Pulmonary effort is normal.   Abdominal:      General: There is no distension.      Palpations: Abdomen is soft. There is no mass.      Tenderness: There is abdominal tenderness in the right lower quadrant, periumbilical area, suprapubic area and left lower quadrant. There is no guarding or rebound.      Hernia: No hernia is present.   Skin:     General: Skin is warm and dry.   Neurological:      Mental " Status: She is alert and oriented to person, place, and time.   Psychiatric:         Attention and Perception: Attention normal.         Mood and Affect: Mood normal.         Speech: Speech normal.         Behavior: Behavior normal.         Thought Content: Thought content normal.         Cognition and Memory: Cognition normal.         Judgment: Judgment normal.         Assessment & Plan     Assessment and Plan    Problem List Items Addressed This Visit    None  Visit Diagnoses       Pelvic pain    -  Primary    Relevant Medications    ibuprofen (ADVIL,MOTRIN) 600 MG tablet    dicyclomine (BENTYL) 10 MG capsule    Other Relevant Orders    US Non-ob Transvaginal    Menorrhagia with regular cycle        Relevant Orders    US Non-ob Transvaginal            Medication(s) Ordered  Imaging Ordered   Call if pain or fever increases.  Follow Up PRN  Follow up with vaginal ultrasound for bilateral pelvic pain. We discussed pelvic exam possibly with next visit to rule out infectious causes as well. We also discussed possibility of the pain being GI related.     Sydnee Amaya, APRN  11/12/2024

## 2024-12-02 ENCOUNTER — OFFICE VISIT (OUTPATIENT)
Dept: OBSTETRICS AND GYNECOLOGY | Facility: CLINIC | Age: 39
End: 2024-12-02
Payer: COMMERCIAL

## 2024-12-02 VITALS — BODY MASS INDEX: 31.62 KG/M2 | SYSTOLIC BLOOD PRESSURE: 114 MMHG | WEIGHT: 190 LBS | DIASTOLIC BLOOD PRESSURE: 70 MMHG

## 2024-12-02 DIAGNOSIS — R10.2 PELVIC PAIN: Primary | ICD-10-CM

## 2024-12-02 DIAGNOSIS — R19.8 RIGHT PELVIC ADNEXAL FLUID COLLECTION: ICD-10-CM

## 2024-12-02 PROCEDURE — 99213 OFFICE O/P EST LOW 20 MIN: CPT

## 2024-12-02 NOTE — PROGRESS NOTES
Pelvic Pain        HPI  Shasta Rogers is a 39 y.o. female, , who presents for follow-up evaluation evaluation of pelvic pain.      Patient was last seen 24 for initial evaluation of pelvic pain. She reported she had the pain off an on since after her delivery in April. Plan was to follow back up in office with Ultrasound and to try Ibuprofen and Bentyl.    Patient reports since the last visit the pain isn't as severe but she does notice that it is there.     Did the patient have u/s today? Yes.  Findings showed right adnexa cystic structure medial to the ovary, hydrosalpinx vs other etiology.    Pending physician review     Her last LMP was Patient's last menstrual period was 2024 (exact date)..  Periods are regular every 28-30 days    Problems with GI: no  History of urinary disease: no  Concern for Anxiety or Depression: yes  Exercises Regularly: yes. walking  Tobacco Usage?: No     Additional OB/GYN History   Last Pap :   Last Completed Pap Smear            PAP SMEAR (Every 3 Years) Next due on 2024  LIQUID-BASED PAP SMEAR WITH HPV GENOTYPING REGARDLESS OF INTERPRETATION (RAMSES,COR,MAD)    2022  SCANNED - PAP SMEAR    10/15/2020  Pap IG, HPV-hr    10/05/2020  SCANNED - PAP SMEAR                    OB History          3    Para   2    Term   2       0    AB   1    Living   2         SAB   1    IAB   0    Ectopic   0    Molar   0    Multiple   0    Live Births   2          Obstetric Comments   Fob #1 - Pregnancy #1, #2, and #3                  Current Outpatient Medications:     ibuprofen (ADVIL,MOTRIN) 600 MG tablet, Take 1 tablet by mouth Every 6 (Six) Hours As Needed for Mild Pain., Disp: 60 tablet, Rfl: 0    prenatal vitamin tablet, Take  by mouth Daily., Disp: , Rfl:      Past Medical History:   Diagnosis Date    Abnormal Pap smear of cervix     Back pain     History of asthma     childhood - resolved    Ovarian cyst         Past Surgical  History:   Procedure Laterality Date    DILATATION AND CURETTAGE  2022       The additional following portions of the patient's history were reviewed and updated as appropriate: allergies and current medications.      Review of Systems   Genitourinary:  Positive for pelvic pain.   All other systems reviewed and are negative.    All other systems reviewed and are negative.     I have reviewed and agree with the HPI, ROS, and historical information as entered above. Sydnee Godfreyser, APRN      Objective   /70   Wt 86.2 kg (190 lb)   LMP 11/27/2024 (Exact Date)   BMI 31.62 kg/m²     Physical Exam  Vitals and nursing note reviewed.   Constitutional:       Appearance: Normal appearance.   Pulmonary:      Effort: Pulmonary effort is normal. No respiratory distress.   Abdominal:      General: There is no distension.      Palpations: Abdomen is soft.      Tenderness: There is no abdominal tenderness. There is no guarding.   Neurological:      Mental Status: She is alert and oriented to person, place, and time.   Psychiatric:         Mood and Affect: Mood normal.         Behavior: Behavior normal.         Thought Content: Thought content normal.         Judgment: Judgment normal.         Assessment & Plan     Assessment and Plan    Problem List Items Addressed This Visit          Gastrointestinal Abdominal     Pelvic pain - Primary    Relevant Medications    ibuprofen (ADVIL,MOTRIN) 600 MG tablet    Other Relevant Orders    Ambulatory Referral to Gastroenterology    US Non-ob Transvaginal    Right pelvic adnexal fluid collection    Overview     12/2/24 Right adnexa cystic structure medial to the ovary 3.2 x 2.7 x 1.2cm, hydrosalpinx vs other etiology         Relevant Orders    US Non-ob Transvaginal       Follow up gyn for bilateral pelvic pain  Right adnexa cystic structure medial to the ovary 3.2 x 2.7 x 1.2cm, hydrosalpinx vs other etiology  We discussed bilateral pain possibly be a gastrointestinal issue-referral  sent.  Follow up in six weeks to check right adnexa cyst    Sydnee Amaya, APRN  12/02/2024

## 2025-01-13 ENCOUNTER — TELEPHONE (OUTPATIENT)
Dept: OBSTETRICS AND GYNECOLOGY | Facility: CLINIC | Age: 40
End: 2025-01-13

## 2025-01-13 NOTE — TELEPHONE ENCOUNTER
Caller: Shasta Rogers    Relationship:  Self    Best call back number: [phone]    PATIENT CALLED REQUESTING TO CANCEL SAME DAY APPT.    Did the patient call AFTER the start time of their scheduled appointment?  []YES  [x]NO    Was the patient's appointment rescheduled? []YES  [x]NO    Any additional information: NEEDS GYN U/S AND VISIT R/S PT IS SICK

## 2025-01-28 ENCOUNTER — OFFICE VISIT (OUTPATIENT)
Dept: OBSTETRICS AND GYNECOLOGY | Facility: CLINIC | Age: 40
End: 2025-01-28
Payer: COMMERCIAL

## 2025-01-28 VITALS
DIASTOLIC BLOOD PRESSURE: 78 MMHG | WEIGHT: 191.4 LBS | SYSTOLIC BLOOD PRESSURE: 116 MMHG | HEIGHT: 65 IN | BODY MASS INDEX: 31.89 KG/M2

## 2025-01-28 DIAGNOSIS — Z63.8 POOR ATTACHMENT TO PRIMARY CAREGIVER: ICD-10-CM

## 2025-01-28 DIAGNOSIS — R19.8 RIGHT PELVIC ADNEXAL FLUID COLLECTION: Primary | ICD-10-CM

## 2025-01-28 RX ORDER — DOXYCYCLINE 100 MG/1
100 CAPSULE ORAL 2 TIMES DAILY
Qty: 14 CAPSULE | Refills: 0 | Status: SHIPPED | OUTPATIENT
Start: 2025-01-28 | End: 2025-02-04

## 2025-01-28 SDOH — SOCIAL STABILITY - SOCIAL INSECURITY: OTHER SPECIFIED PROBLEMS RELATED TO PRIMARY SUPPORT GROUP: Z63.8

## 2025-01-28 NOTE — PROGRESS NOTES
"      Chief Complaint   Patient presents with    Follow-up         Subjective   HPI  Shasta Rogers is a 39 y.o. female, , who presents for follow up evaluation of ovarian cyst.      Her last LMP was Patient's last menstrual period was 2025 (exact date)..  She was last seen on 24. US at that time showed \"Right adnexa cystic structure medial to the ovary 3.2 x 2.7 x 1.2cm, hydrosalpinx vs other etiology.\" At that time the plan was expectant management for management of the cyst. Since her last visit she admits pelvic pain. States pain has improved and is less consistent since her last visit but is still present. The patient reports additional symptoms as none.      Did the patient have u/s today? Yes    Additional OB/GYN History   Last Pap : 24 Negative, HPV negative.   Last Completed Pap Smear            PAP SMEAR (Every 3 Years) Next due on 2024  LIQUID-BASED PAP SMEAR WITH HPV GENOTYPING REGARDLESS OF INTERPRETATION (RAMSES,COR,MAD)    2022  SCANNED - PAP SMEAR    10/15/2020  Pap IG, HPV-hr    10/05/2020  SCANNED - PAP SMEAR                  History of abnormal Pap smear: yes - several years ago  Tobacco Usage?: No      Current Outpatient Medications:     doxycycline (MONODOX) 100 MG capsule, Take 1 capsule by mouth 2 (Two) Times a Day for 7 days., Disp: 14 capsule, Rfl: 0     Past Medical History:   Diagnosis Date    Abnormal Pap smear of cervix     Back pain     History of asthma     childhood - resolved    Ovarian cyst         Past Surgical History:   Procedure Laterality Date    DILATATION AND CURETTAGE         The additional following portions of the patient's history were reviewed and updated as appropriate: allergies, current medications, past family history, past medical history, past social history, past surgical history, and problem list.    Review of Systems   Constitutional: Negative.    HENT: Negative.     Eyes: Negative.    Respiratory: Negative.   " "  Cardiovascular: Negative.    Gastrointestinal: Negative.    Endocrine: Negative.    Genitourinary:  Positive for pelvic pain.   Musculoskeletal: Negative.    Skin: Negative.    Allergic/Immunologic: Negative.    Neurological: Negative.    Hematological: Negative.    Psychiatric/Behavioral: Negative.       All other systems reviewed and are negative.     I have reviewed and agree with the HPI, ROS, and historical information as entered above. Sydnee Amaya, APRN      /78   Ht 165.1 cm (65\")   Wt 86.8 kg (191 lb 6.4 oz)   LMP 01/21/2025 (Exact Date)   BMI 31.85 kg/m²     Physical Exam  Vitals and nursing note reviewed.   Constitutional:       Appearance: Normal appearance.   Pulmonary:      Effort: Pulmonary effort is normal.   Abdominal:      General: There is no distension.      Palpations: Abdomen is soft.      Tenderness: There is no abdominal tenderness. There is no guarding.   Skin:     General: Skin is warm and dry.   Neurological:      Mental Status: She is alert and oriented to person, place, and time.   Psychiatric:         Mood and Affect: Mood normal.         Behavior: Behavior normal.         Thought Content: Thought content normal.         Judgment: Judgment normal.         Assessment & Plan     Assessment and Plan    Problem List Items Addressed This Visit          Gastrointestinal Abdominal     Right pelvic adnexal fluid collection - Primary    Overview     12/2/24 Right adnexa cystic structure medial to the ovary 3.2 x 2.7 x 1.2cm, hydrosalpinx vs other etiology    01/28/25 fluid structure with solid component echogenic area 4x4 mm wall projection and echogenic wall projection 5x2mm and small septation 3.5mm x2x2.7cm         Relevant Medications    doxycycline (MONODOX) 100 MG capsule    Other Relevant Orders    US Non-ob Transvaginal     Other Visit Diagnoses       Poor attachment to primary caregiver        Relevant Orders    Ambulatory Referral to Family Practice (Completed)      "       GYN follow up for right pelvic adnexal fluid collection  Ultrasound today showed fluid structure with solid component echogenic area 4x4 mm wall projection and echogenic wall projection 5x2mm and small septation 3.5mm x2x2.7cm  Discussed vaginal ultrasound findings with J[facility] compared to last ultrasound. Dx peritubal cyst-rx for doxycycline to help dissolve and will follow up with repeat ultrasound in six weeks.   Ovarian torsion and rupture precautions reviewed with patient.  Advised to begin NSAIDs, Tylenol, and or heating pad as needed for mild to moderate pain.  ED for severe pain.  Patient verbalized understanding.       Sydnee Amaya, APRN  01/28/2025

## 2025-02-20 ENCOUNTER — TRANSCRIBE ORDERS (OUTPATIENT)
Dept: ADMINISTRATIVE | Facility: HOSPITAL | Age: 40
End: 2025-02-20
Payer: COMMERCIAL

## 2025-02-20 DIAGNOSIS — Z12.31 VISIT FOR SCREENING MAMMOGRAM: Primary | ICD-10-CM

## 2025-03-11 ENCOUNTER — HOSPITAL ENCOUNTER (OUTPATIENT)
Dept: MAMMOGRAPHY | Facility: HOSPITAL | Age: 40
Discharge: HOME | End: 2025-03-11
Admitting: OBSTETRICS & GYNECOLOGY
Payer: COMMERCIAL

## 2025-03-11 ENCOUNTER — RESULTS FOLLOW-UP (OUTPATIENT)
Facility: HOSPITAL | Age: 40
End: 2025-03-11
Payer: COMMERCIAL

## 2025-03-11 DIAGNOSIS — Z12.31 VISIT FOR SCREENING MAMMOGRAM: ICD-10-CM

## 2025-03-11 LAB
NCCN CRITERIA FLAG: ABNORMAL
TYRER CUZICK SCORE: 13.3

## 2025-03-11 PROCEDURE — 77067 SCR MAMMO BI INCL CAD: CPT

## 2025-03-11 PROCEDURE — 77063 BREAST TOMOSYNTHESIS BI: CPT

## 2025-03-11 NOTE — PROGRESS NOTES
I left a voice mail and sent a Finjan message requesting a return call to discuss risk assessment results.

## 2025-03-12 ENCOUNTER — OFFICE VISIT (OUTPATIENT)
Dept: OBSTETRICS AND GYNECOLOGY | Facility: CLINIC | Age: 40
End: 2025-03-12
Payer: COMMERCIAL

## 2025-03-12 VITALS
DIASTOLIC BLOOD PRESSURE: 68 MMHG | WEIGHT: 193 LBS | SYSTOLIC BLOOD PRESSURE: 120 MMHG | HEIGHT: 65 IN | BODY MASS INDEX: 32.15 KG/M2

## 2025-03-12 DIAGNOSIS — Z30.011 ENCOUNTER FOR INITIAL PRESCRIPTION OF CONTRACEPTIVE PILLS: ICD-10-CM

## 2025-03-12 DIAGNOSIS — R19.8 RIGHT PELVIC ADNEXAL FLUID COLLECTION: Primary | ICD-10-CM

## 2025-03-12 PROBLEM — R10.2 PELVIC PAIN: Status: RESOLVED | Noted: 2024-11-12 | Resolved: 2025-03-12

## 2025-03-12 PROBLEM — N92.0 MENORRHAGIA WITH REGULAR CYCLE: Status: RESOLVED | Noted: 2024-11-12 | Resolved: 2025-03-12

## 2025-03-12 RX ORDER — NORETHINDRONE ACETATE AND ETHINYL ESTRADIOL 1MG-20(21)
1 KIT ORAL DAILY
Qty: 28 TABLET | Refills: 12 | Status: SHIPPED | OUTPATIENT
Start: 2025-03-12 | End: 2026-03-12

## 2025-03-12 NOTE — PROGRESS NOTES
Chief Complaint   Patient presents with    Follow-up   -Discussed birth control options      Subjective   HPI  Shasta Rogers is a 40 y.o. female, , who presents for evaluation of ovarian cyst.  Pt was last seen on 2025 by MARGARITA Ramirez. Plan was Dx peritubal cyst-rx for doxycycline to help dissolve and will follow up with repeat ultrasound in six weeks.     She states she has  no pelvic pain .    Did the patient have u/s today? No. US was done yesterday.     Her last LMP was Patient's last menstrual period was 2025 (exact date)..  Periods are regular every 28-30 days, lasting  3-4  days.  Dysmenorrhea:none.  Partner Status: Marital Status: .  New Partners since last visit: no.  Desires STD Screening: no.    Pt states she would like to be on birth control now. Pt does not remember which birth control she was on. Pt denies migraines and any blood clotting disorders. Pt does not smoke.     Additional OB/GYN History   Last Pap :   Last Completed Pap Smear            Upcoming       PAP SMEAR (Every 3 Years) Next due on 2024  LIQUID-BASED PAP SMEAR WITH HPV GENOTYPING REGARDLESS OF INTERPRETATION (RAMSES,COR,MAD)    2022  SCANNED - PAP SMEAR    10/15/2020  Pap IG, HPV-hr    10/05/2020  SCANNED - PAP SMEAR                          History of abnormal Pap smear: no  Tobacco Usage?: No       Current Outpatient Medications:     norethindrone-ethinyl estradiol FE (Junel FE 1/20) 1-20 MG-MCG per tablet, Take 1 tablet by mouth Daily., Disp: 28 tablet, Rfl: 12     Past Medical History:   Diagnosis Date    Abnormal Pap smear of cervix     Back pain     History of asthma     childhood - resolved    Ovarian cyst         Past Surgical History:   Procedure Laterality Date    DILATATION AND CURETTAGE         The additional following portions of the patient's history were reviewed and updated as appropriate: allergies, current medications, past family history, past medical  "history, past social history, past surgical history, and problem list.    Review of Systems   Constitutional: Negative.    HENT: Negative.     Eyes: Negative.    Respiratory: Negative.     Cardiovascular: Negative.    Gastrointestinal: Negative.    Endocrine: Negative.    Genitourinary: Negative.    Musculoskeletal: Negative.    Skin: Negative.    Allergic/Immunologic: Negative.    Neurological: Negative.    Hematological: Negative.    Psychiatric/Behavioral: Negative.       All other systems reviewed and are negative.     I have reviewed and agree with the HPI, ROS, and historical information as entered above. Debora Prajapati MD      /68   Ht 165.1 cm (65\")   Wt 87.5 kg (193 lb)   LMP 02/25/2025 (Exact Date)   BMI 32.12 kg/m²     Physical Exam  Vitals and nursing note reviewed.   Constitutional:       Appearance: Normal appearance. She is well-developed.   HENT:      Head: Normocephalic and atraumatic.   Pulmonary:      Effort: Pulmonary effort is normal.      Breath sounds: Normal breath sounds.   Abdominal:      General: There is no distension.      Palpations: Abdomen is soft. Abdomen is not rigid. There is no mass.      Tenderness: There is no abdominal tenderness. There is no guarding or rebound.   Musculoskeletal:      Cervical back: Normal range of motion.   Skin:     General: Skin is warm and dry.   Neurological:      Mental Status: She is alert and oriented to person, place, and time.   Psychiatric:         Mood and Affect: Mood normal.         Behavior: Behavior normal.         Assessment & Plan     Assessment and Plan    Problem List Items Addressed This Visit          Gastrointestinal Abdominal     Right pelvic adnexal fluid collection - Primary    Overview   12/2/24 Right adnexa cystic structure medial to the ovary 3.2 x 2.7 x 1.2cm, hydrosalpinx vs other etiology    01/28/25 fluid structure with solid component echogenic area 4x4 mm wall projection and echogenic wall projection 5x2mm " and small septation 3.5mm x2x2.7cm    3/12/2025-  Right adnexal cystic structure more consistent with hydrosalpinx on exam today.  It now measures 4.3 x 1.8 x 3.3 cm.  Solid component no longer visualized.  Discussed with patient surgical management versus expectant management.  Patient reporting no pain at all.  She request expectant management at this time.  We discussed that this most likely looks like a hydrosalpinx at this time.  Patient delivered less than a year ago.  Will reevaluate in 2 to 3 months.  If still present, reevaluate for surgical management.         Relevant Orders    US Non-ob Transvaginal       Gravid and      (normal spontaneous vaginal delivery)    Overview   - baby boy weighing 7 pounds 15 ounces at 39 weeks and 1 day.          Other Visit Diagnoses         Encounter for initial prescription of contraceptive pills        Relevant Medications    norethindrone-ethinyl estradiol FE (Junel FE 1/20) 1-20 MG-MCG per tablet            Medication(s) Ordered  Patient also discussing mood today.  She is struggling with sleep.  Her 1-year-old is still not sleeping through the night and wakes up 2-3 times.  We discussed the importance of sleep training along with taking care of herself.  Imaging Ordered   Follow Up: Return in about 10 weeks (around 2025) for ultrasound.  Total time spent today with Shasta  was 30 minutes (level 4).  Off this time, 100% was spent face-to-face time coordinating care, answering her questions and counseling regarding pathophysiology of her presenting problem along with plans for any diagnositc work-up and treatment.  This time was outside of the time it took to read the ultrasound.      Debora Prajapati MD  2025

## 2025-03-17 ENCOUNTER — DOCUMENTATION (OUTPATIENT)
Dept: GENETICS | Facility: HOSPITAL | Age: 40
End: 2025-03-17
Payer: COMMERCIAL

## 2025-03-17 NOTE — PROGRESS NOTES
This patient recently completed the CARE risk assessment for a mammogram appointment. Based on the patient's responses, NCCN criteria for genetic testing was met. At the time of the assessment, the patient was provided with both written and video educational materials regarding genetic testing.      Attempts to reach the patient to discuss the risk assessment results have been unsuccessful.

## 2025-05-27 ENCOUNTER — TELEPHONE (OUTPATIENT)
Dept: OBSTETRICS AND GYNECOLOGY | Facility: CLINIC | Age: 40
End: 2025-05-27

## 2025-05-27 NOTE — TELEPHONE ENCOUNTER
Caller: SALOMÓN SUSHIL    Best call back number: [phone]    PATIENT CALLED REQUESTING TO CANCEL SAME DAY APPT.    Did the patient call AFTER the start time of their scheduled appointment?  []YES  [x]NO    Was the patient's appointment rescheduled? []YES  [x]NO    Any additional information: APPT SHOULD  HAVE AN U/S ITS A F/U OF A CYST    NEEDS TO RESCHEDULE

## 2025-06-09 ENCOUNTER — OFFICE VISIT (OUTPATIENT)
Dept: OBSTETRICS AND GYNECOLOGY | Facility: CLINIC | Age: 40
End: 2025-06-09
Payer: COMMERCIAL

## 2025-06-09 VITALS
DIASTOLIC BLOOD PRESSURE: 80 MMHG | BODY MASS INDEX: 31.82 KG/M2 | HEIGHT: 65 IN | WEIGHT: 191 LBS | SYSTOLIC BLOOD PRESSURE: 118 MMHG

## 2025-06-09 DIAGNOSIS — R19.8 RIGHT PELVIC ADNEXAL FLUID COLLECTION: Primary | ICD-10-CM
